# Patient Record
Sex: FEMALE | Race: WHITE | NOT HISPANIC OR LATINO | URBAN - METROPOLITAN AREA
[De-identification: names, ages, dates, MRNs, and addresses within clinical notes are randomized per-mention and may not be internally consistent; named-entity substitution may affect disease eponyms.]

---

## 2017-10-02 ENCOUNTER — HOME CARE/HOSPICE - HEALTHEAST (OUTPATIENT)
Dept: HOME HEALTH SERVICES | Facility: HOME HEALTH | Age: 82
End: 2017-10-02

## 2017-10-04 ENCOUNTER — HOME CARE/HOSPICE - HEALTHEAST (OUTPATIENT)
Dept: HOME HEALTH SERVICES | Facility: HOME HEALTH | Age: 82
End: 2017-10-04

## 2017-10-09 ENCOUNTER — HOME CARE/HOSPICE - HEALTHEAST (OUTPATIENT)
Dept: HOME HEALTH SERVICES | Facility: HOME HEALTH | Age: 82
End: 2017-10-09

## 2017-10-11 ENCOUNTER — HOME CARE/HOSPICE - HEALTHEAST (OUTPATIENT)
Dept: HOME HEALTH SERVICES | Facility: HOME HEALTH | Age: 82
End: 2017-10-11

## 2017-10-12 ENCOUNTER — HOME CARE/HOSPICE - HEALTHEAST (OUTPATIENT)
Dept: HOME HEALTH SERVICES | Facility: HOME HEALTH | Age: 82
End: 2017-10-12

## 2017-10-13 ASSESSMENT — MIFFLIN-ST. JEOR: SCORE: 916.18

## 2017-10-14 ENCOUNTER — HOME CARE/HOSPICE - HEALTHEAST (OUTPATIENT)
Dept: HOME HEALTH SERVICES | Facility: HOME HEALTH | Age: 82
End: 2017-10-14

## 2017-10-15 ENCOUNTER — HOME CARE/HOSPICE - HEALTHEAST (OUTPATIENT)
Dept: HOME HEALTH SERVICES | Facility: HOME HEALTH | Age: 82
End: 2017-10-15

## 2017-10-15 ASSESSMENT — MIFFLIN-ST. JEOR
SCORE: 841.79
SCORE: 850.86

## 2017-10-16 ENCOUNTER — SURGERY - HEALTHEAST (OUTPATIENT)
Dept: GASTROENTEROLOGY | Facility: CLINIC | Age: 82
End: 2017-10-16

## 2017-10-20 ENCOUNTER — AMBULATORY - HEALTHEAST (OUTPATIENT)
Dept: GERIATRICS | Facility: CLINIC | Age: 82
End: 2017-10-20

## 2017-10-20 ENCOUNTER — OFFICE VISIT - HEALTHEAST (OUTPATIENT)
Dept: GERIATRICS | Facility: CLINIC | Age: 82
End: 2017-10-20

## 2017-10-20 DIAGNOSIS — F32.A DEPRESSION: ICD-10-CM

## 2017-10-20 DIAGNOSIS — E78.5 DYSLIPIDEMIA: ICD-10-CM

## 2017-10-20 DIAGNOSIS — R29.6 FALLS FREQUENTLY: ICD-10-CM

## 2017-10-20 DIAGNOSIS — K51.311 ULCERATIVE RECTOSIGMOIDITIS WITH RECTAL BLEEDING (H): ICD-10-CM

## 2017-10-20 DIAGNOSIS — K62.5 RECTAL BLEED: ICD-10-CM

## 2017-10-20 RX ORDER — SERTRALINE HYDROCHLORIDE 25 MG/1
25 TABLET, FILM COATED ORAL DAILY
Status: SHIPPED | COMMUNITY
Start: 2017-10-20

## 2017-10-24 ENCOUNTER — AMBULATORY - HEALTHEAST (OUTPATIENT)
Dept: GERIATRICS | Facility: CLINIC | Age: 82
End: 2017-10-24

## 2017-10-24 ENCOUNTER — OFFICE VISIT - HEALTHEAST (OUTPATIENT)
Dept: GERIATRICS | Facility: CLINIC | Age: 82
End: 2017-10-24

## 2017-10-24 DIAGNOSIS — R29.6 FALLS FREQUENTLY: ICD-10-CM

## 2017-10-24 DIAGNOSIS — E78.5 DYSLIPIDEMIA: ICD-10-CM

## 2017-10-24 DIAGNOSIS — K51.311 ULCERATIVE RECTOSIGMOIDITIS WITH RECTAL BLEEDING (H): ICD-10-CM

## 2017-10-24 DIAGNOSIS — I10 ESSENTIAL HYPERTENSION: ICD-10-CM

## 2017-10-27 ENCOUNTER — OFFICE VISIT - HEALTHEAST (OUTPATIENT)
Dept: GERIATRICS | Facility: CLINIC | Age: 82
End: 2017-10-27

## 2017-10-27 DIAGNOSIS — K51.311 ULCERATIVE RECTOSIGMOIDITIS WITH RECTAL BLEEDING (H): ICD-10-CM

## 2017-10-27 DIAGNOSIS — I10 ESSENTIAL HYPERTENSION: ICD-10-CM

## 2017-10-27 DIAGNOSIS — F32.A DEPRESSION: ICD-10-CM

## 2017-10-27 DIAGNOSIS — E78.5 DYSLIPIDEMIA: ICD-10-CM

## 2017-10-31 ENCOUNTER — OFFICE VISIT - HEALTHEAST (OUTPATIENT)
Dept: GERIATRICS | Facility: CLINIC | Age: 82
End: 2017-10-31

## 2017-10-31 DIAGNOSIS — K55.9 ISCHEMIC COLITIS (H): ICD-10-CM

## 2017-10-31 DIAGNOSIS — K51.311 ULCERATIVE RECTOSIGMOIDITIS WITH RECTAL BLEEDING (H): ICD-10-CM

## 2017-10-31 DIAGNOSIS — R29.6 FALLS FREQUENTLY: ICD-10-CM

## 2017-11-03 ENCOUNTER — OFFICE VISIT - HEALTHEAST (OUTPATIENT)
Dept: GERIATRICS | Facility: CLINIC | Age: 82
End: 2017-11-03

## 2017-11-03 DIAGNOSIS — F32.A DEPRESSION: ICD-10-CM

## 2017-11-03 DIAGNOSIS — K51.311 ULCERATIVE RECTOSIGMOIDITIS WITH RECTAL BLEEDING (H): ICD-10-CM

## 2017-11-03 DIAGNOSIS — E78.5 DYSLIPIDEMIA: ICD-10-CM

## 2017-11-03 DIAGNOSIS — R29.6 FALLS FREQUENTLY: ICD-10-CM

## 2017-11-03 DIAGNOSIS — I10 ESSENTIAL HYPERTENSION: ICD-10-CM

## 2017-11-07 ENCOUNTER — OFFICE VISIT - HEALTHEAST (OUTPATIENT)
Dept: GERIATRICS | Facility: CLINIC | Age: 82
End: 2017-11-07

## 2017-11-07 DIAGNOSIS — F41.9 ANXIETY: ICD-10-CM

## 2017-11-07 DIAGNOSIS — F32.A DEPRESSION: ICD-10-CM

## 2017-11-07 DIAGNOSIS — E78.5 DYSLIPIDEMIA: ICD-10-CM

## 2017-11-07 DIAGNOSIS — K51.311 ULCERATIVE RECTOSIGMOIDITIS WITH RECTAL BLEEDING (H): ICD-10-CM

## 2017-11-07 DIAGNOSIS — I10 ESSENTIAL HYPERTENSION: ICD-10-CM

## 2017-11-10 ENCOUNTER — OFFICE VISIT - HEALTHEAST (OUTPATIENT)
Dept: GERIATRICS | Facility: CLINIC | Age: 82
End: 2017-11-10

## 2017-11-10 DIAGNOSIS — K55.9 ISCHEMIC COLITIS (H): ICD-10-CM

## 2017-11-10 DIAGNOSIS — R29.6 FALLS FREQUENTLY: ICD-10-CM

## 2017-11-14 ENCOUNTER — OFFICE VISIT - HEALTHEAST (OUTPATIENT)
Dept: GERIATRICS | Facility: CLINIC | Age: 82
End: 2017-11-14

## 2017-11-14 DIAGNOSIS — I10 ESSENTIAL HYPERTENSION: ICD-10-CM

## 2017-11-14 DIAGNOSIS — E78.5 HYPERLIPIDEMIA: ICD-10-CM

## 2017-11-14 DIAGNOSIS — R51.9 HEADACHE: ICD-10-CM

## 2017-11-14 DIAGNOSIS — R11.0 NAUSEA: ICD-10-CM

## 2017-11-14 DIAGNOSIS — E78.5 DYSLIPIDEMIA: ICD-10-CM

## 2017-11-14 DIAGNOSIS — F32.A DEPRESSION: ICD-10-CM

## 2017-11-14 DIAGNOSIS — K51.311 ULCERATIVE RECTOSIGMOIDITIS WITH RECTAL BLEEDING (H): ICD-10-CM

## 2017-11-16 RX ORDER — SULFASALAZINE 500 MG/1
1000 TABLET ORAL 2 TIMES DAILY
Status: SHIPPED | COMMUNITY
Start: 2017-11-16

## 2017-11-22 ENCOUNTER — AMBULATORY - HEALTHEAST (OUTPATIENT)
Dept: GERIATRICS | Facility: CLINIC | Age: 82
End: 2017-11-22

## 2017-11-24 ENCOUNTER — OFFICE VISIT - HEALTHEAST (OUTPATIENT)
Dept: GERIATRICS | Facility: CLINIC | Age: 82
End: 2017-11-24

## 2017-11-24 DIAGNOSIS — R29.6 FALLS FREQUENTLY: ICD-10-CM

## 2017-11-24 DIAGNOSIS — K51.311 ULCERATIVE RECTOSIGMOIDITIS WITH RECTAL BLEEDING (H): ICD-10-CM

## 2017-11-24 DIAGNOSIS — I26.99 PE (PULMONARY THROMBOEMBOLISM) (H): ICD-10-CM

## 2017-11-27 ENCOUNTER — AMBULATORY - HEALTHEAST (OUTPATIENT)
Dept: GERIATRICS | Facility: CLINIC | Age: 82
End: 2017-11-27

## 2017-11-27 ENCOUNTER — AMBULATORY - HEALTHEAST (OUTPATIENT)
Dept: ADMINISTRATIVE | Facility: CLINIC | Age: 82
End: 2017-11-27

## 2017-11-28 ENCOUNTER — OFFICE VISIT - HEALTHEAST (OUTPATIENT)
Dept: GERIATRICS | Facility: CLINIC | Age: 82
End: 2017-11-28

## 2017-11-28 DIAGNOSIS — K55.9 ISCHEMIC COLITIS (H): ICD-10-CM

## 2017-11-28 DIAGNOSIS — R29.6 FALLS FREQUENTLY: ICD-10-CM

## 2017-11-30 ENCOUNTER — AMBULATORY - HEALTHEAST (OUTPATIENT)
Dept: GERIATRICS | Facility: CLINIC | Age: 82
End: 2017-11-30

## 2017-12-01 ENCOUNTER — AMBULATORY - HEALTHEAST (OUTPATIENT)
Dept: GERIATRICS | Facility: CLINIC | Age: 82
End: 2017-12-01

## 2017-12-28 ENCOUNTER — OFFICE VISIT - HEALTHEAST (OUTPATIENT)
Dept: GERIATRICS | Facility: CLINIC | Age: 82
End: 2017-12-28

## 2017-12-28 DIAGNOSIS — R29.6 FALLS FREQUENTLY: ICD-10-CM

## 2017-12-28 DIAGNOSIS — I10 ESSENTIAL HYPERTENSION: ICD-10-CM

## 2017-12-28 DIAGNOSIS — K55.9 ISCHEMIC COLITIS (H): ICD-10-CM

## 2017-12-29 ENCOUNTER — AMBULATORY - HEALTHEAST (OUTPATIENT)
Dept: ADMINISTRATIVE | Facility: CLINIC | Age: 82
End: 2017-12-29

## 2017-12-29 ENCOUNTER — OFFICE VISIT - HEALTHEAST (OUTPATIENT)
Dept: GERIATRICS | Facility: CLINIC | Age: 82
End: 2017-12-29

## 2017-12-29 DIAGNOSIS — E87.1 HYPONATREMIA: ICD-10-CM

## 2017-12-29 DIAGNOSIS — D64.9 ANEMIA: ICD-10-CM

## 2017-12-29 DIAGNOSIS — I10 ESSENTIAL HYPERTENSION: ICD-10-CM
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
## 2017-12-29 DIAGNOSIS — I26.99 PE (PULMONARY THROMBOEMBOLISM) (H): ICD-10-CM

## 2017-12-29 DIAGNOSIS — K51.311 ULCERATIVE RECTOSIGMOIDITIS WITH RECTAL BLEEDING (H): ICD-10-CM

## 2017-12-29 RX ORDER — PREDNISONE 10 MG/1
5 TABLET ORAL DAILY
Status: SHIPPED | COMMUNITY
Start: 2017-12-29

## 2017-12-29 RX ORDER — LANOLIN ALCOHOL/MO/W.PET/CERES
3 CREAM (GRAM) TOPICAL
Status: SHIPPED | COMMUNITY
Start: 2017-12-29

## 2018-01-02 ENCOUNTER — RECORDS - HEALTHEAST (OUTPATIENT)
Dept: LAB | Facility: CLINIC | Age: 83
End: 2018-01-02

## 2018-01-02 ENCOUNTER — OFFICE VISIT - HEALTHEAST (OUTPATIENT)
Dept: GERIATRICS | Facility: CLINIC | Age: 83
End: 2018-01-02

## 2018-01-02 DIAGNOSIS — I10 ESSENTIAL HYPERTENSION: ICD-10-CM

## 2018-01-02 DIAGNOSIS — R29.6 FALLS FREQUENTLY: ICD-10-CM

## 2018-01-02 DIAGNOSIS — K55.9 ISCHEMIC COLITIS (H): ICD-10-CM

## 2018-01-02 LAB
ANION GAP SERPL CALCULATED.3IONS-SCNC: 6 MMOL/L (ref 5–18)
BUN SERPL-MCNC: 9 MG/DL (ref 8–28)
CALCIUM SERPL-MCNC: 8.1 MG/DL (ref 8.5–10.5)
CHLORIDE BLD-SCNC: 96 MMOL/L (ref 98–107)
CO2 SERPL-SCNC: 25 MMOL/L (ref 22–31)
CREAT SERPL-MCNC: 0.53 MG/DL (ref 0.6–1.1)
ERYTHROCYTE [DISTWIDTH] IN BLOOD BY AUTOMATED COUNT: 18.6 % (ref 11–14.5)
GFR SERPL CREATININE-BSD FRML MDRD: >60 ML/MIN/1.73M2
GLUCOSE BLD-MCNC: 97 MG/DL (ref 70–125)
HCT VFR BLD AUTO: 32.4 % (ref 35–47)
HGB BLD-MCNC: 10.3 G/DL (ref 12–16)
MCH RBC QN AUTO: 26.6 PG (ref 27–34)
MCHC RBC AUTO-ENTMCNC: 31.8 G/DL (ref 32–36)
MCV RBC AUTO: 84 FL (ref 80–100)
PLATELET # BLD AUTO: 364 THOU/UL (ref 140–440)
PMV BLD AUTO: 9.8 FL (ref 8.5–12.5)
POTASSIUM BLD-SCNC: 3.8 MMOL/L (ref 3.5–5)
RBC # BLD AUTO: 3.87 MILL/UL (ref 3.8–5.4)
SODIUM SERPL-SCNC: 127 MMOL/L (ref 136–145)
WBC: 14.9 THOU/UL (ref 4–11)

## 2018-01-03 ENCOUNTER — OFFICE VISIT - HEALTHEAST (OUTPATIENT)
Dept: GERIATRICS | Facility: CLINIC | Age: 83
End: 2018-01-03

## 2018-01-03 DIAGNOSIS — R53.1 WEAKNESS: ICD-10-CM

## 2018-01-03 DIAGNOSIS — R19.7 DIARRHEA: ICD-10-CM

## 2018-01-05 ENCOUNTER — OFFICE VISIT - HEALTHEAST (OUTPATIENT)
Dept: GERIATRICS | Facility: CLINIC | Age: 83
End: 2018-01-05

## 2018-01-05 DIAGNOSIS — K51.311 ULCERATIVE RECTOSIGMOIDITIS WITH RECTAL BLEEDING (H): ICD-10-CM

## 2018-01-05 DIAGNOSIS — R53.1 WEAKNESS: ICD-10-CM

## 2018-01-05 DIAGNOSIS — I10 ESSENTIAL HYPERTENSION: ICD-10-CM

## 2018-01-05 DIAGNOSIS — R29.6 FALLS FREQUENTLY: ICD-10-CM

## 2018-01-06 ENCOUNTER — RECORDS - HEALTHEAST (OUTPATIENT)
Dept: LAB | Facility: CLINIC | Age: 83
End: 2018-01-06

## 2018-01-08 LAB — O+P STL MICRO: NORMAL

## 2018-01-09 ENCOUNTER — OFFICE VISIT - HEALTHEAST (OUTPATIENT)
Dept: GERIATRICS | Facility: CLINIC | Age: 83
End: 2018-01-09

## 2018-01-09 DIAGNOSIS — K55.9 ISCHEMIC COLITIS (H): ICD-10-CM

## 2018-01-09 DIAGNOSIS — R29.6 FALLS FREQUENTLY: ICD-10-CM

## 2018-01-09 DIAGNOSIS — R53.1 WEAKNESS: ICD-10-CM

## 2018-01-09 LAB — BACTERIA SPEC CULT: NORMAL

## 2018-01-12 ENCOUNTER — OFFICE VISIT - HEALTHEAST (OUTPATIENT)
Dept: GERIATRICS | Facility: CLINIC | Age: 83
End: 2018-01-12

## 2018-01-12 DIAGNOSIS — R53.1 WEAKNESS: ICD-10-CM

## 2018-01-12 DIAGNOSIS — I26.99 PE (PULMONARY THROMBOEMBOLISM) (H): ICD-10-CM

## 2018-01-12 DIAGNOSIS — K51.311 ULCERATIVE RECTOSIGMOIDITIS WITH RECTAL BLEEDING (H): ICD-10-CM

## 2018-01-12 DIAGNOSIS — I10 ESSENTIAL HYPERTENSION: ICD-10-CM

## 2018-01-12 DIAGNOSIS — R29.6 FALLS FREQUENTLY: ICD-10-CM

## 2018-01-15 ENCOUNTER — AMBULATORY - HEALTHEAST (OUTPATIENT)
Dept: GERIATRICS | Facility: CLINIC | Age: 83
End: 2018-01-15

## 2020-10-06 ENCOUNTER — AMBULATORY - HEALTHEAST (OUTPATIENT)
Dept: OTHER | Facility: CLINIC | Age: 85
End: 2020-10-06

## 2021-05-31 VITALS — WEIGHT: 114 LBS | BODY MASS INDEX: 22.26 KG/M2

## 2021-05-31 VITALS — WEIGHT: 119.2 LBS | BODY MASS INDEX: 23.28 KG/M2

## 2021-05-31 VITALS — BODY MASS INDEX: 22.3 KG/M2 | HEIGHT: 60 IN | WEIGHT: 113.6 LBS

## 2021-05-31 VITALS — WEIGHT: 117.4 LBS | BODY MASS INDEX: 22.93 KG/M2

## 2021-05-31 VITALS — BODY MASS INDEX: 23.36 KG/M2 | WEIGHT: 119.6 LBS

## 2021-05-31 VITALS — BODY MASS INDEX: 22.89 KG/M2 | WEIGHT: 117.2 LBS

## 2021-05-31 VITALS — WEIGHT: 118 LBS | BODY MASS INDEX: 23.05 KG/M2

## 2021-05-31 VITALS — BODY MASS INDEX: 22.46 KG/M2 | WEIGHT: 115 LBS

## 2021-05-31 VITALS — WEIGHT: 112 LBS | BODY MASS INDEX: 21.87 KG/M2

## 2021-05-31 VITALS — WEIGHT: 112.4 LBS | BODY MASS INDEX: 21.95 KG/M2

## 2021-05-31 VITALS — BODY MASS INDEX: 22.26 KG/M2 | WEIGHT: 114 LBS

## 2021-05-31 VITALS — WEIGHT: 116.6 LBS | BODY MASS INDEX: 22.77 KG/M2

## 2021-06-13 NOTE — PROGRESS NOTES
"     [facility]    DATE: 10/20/2017    NAME:  Jessica Castellon             :  1928  MRN: [id number]  CODE STATUS:  DNR    FACILITY:  [facility] [[id number]]       ROOM:   M214    CHIEF COMPLAINT/REASON FOR VISIT:  Chief Complaint   Patient presents with     Problem Visit     Ulcerative Colitis     HISTORY OF PRESENT ILLNESS: Jessica Castellon is a 89 y.o. female with Hypertension, Dyslipidemia, Recurrent falls and Syncope who originally presented for evaluation of \"feeling off\".  She reported having intermittent rectal bleeding for months which has worsened recently.  She lost 20-30 pounds in the last several months.  She has been feeling lightheaded and falling in the last several months.  On initial evaluation in the ER patient was found afebrile, normotensive, mildly tachycardic.  Her labs were significant for microcytic anemia with hemoglobin 9.9.  CT of the abdomen and pelvis demonstrated moderate to severe acute colitis from distal transverse portion to the rectum.  Initially, ischemic versus infectious colitis was suspected, however subsequently patient underwent flexible sigmoidoscopy and pathology was mostly consistent with ulcerative colitis.  Mrs. Castellon was started on IV steroids and was discharged on steroid taper.  She will follow-up with GI as an outpatient to establish care and to determine long-term treatment.  She was stabilized and discharged to TCU for rehabilitation.    Past Medical History:   Diagnosis Date     Diabetes mellitus      Hyperlipidemia      Past Surgical History:   Procedure Laterality Date     CYST REMOVAL       SIGMOIDOSCOPY N/A 10/16/2017    Procedure: SIGMOIDOSCOPY with biopsy;  Surgeon: Trey Hercules MD;  Location: [facility];  Service:      No family history on file.  Social History     Social History     Marital status:      Spouse name: N/A     Number of children: N/A     Years of education: N/A     Occupational History "     Not on file.     Social History Main Topics     Smoking status: Never Smoker     Smokeless tobacco: Never Used     Alcohol use No     Drug use: No     Sexual activity: Not on file     Other Topics Concern     Not on file     Social History Narrative     Allergies   Allergen Reactions     Pneumococcal Vaccine Rash     Current Outpatient Prescriptions   Medication Sig Dispense Refill     acetaminophen (TYLENOL) 325 MG tablet Take 650 mg by mouth daily as needed for pain (back pain).       lovastatin (ALTOPREV) 20 MG 24 hr tablet Take 20 mg by mouth every other day.       predniSONE (DELTASONE) 10 mg tablet Take 40 mg daily for 1 week, then 30 mg daily for 1 week, then 20 mg daily for 1 week, then 10 mg daily 70 tablet 0     sertraline (ZOLOFT) 25 MG tablet Take 25 mg by mouth daily.       vitamin A-vitamin C-vit E-min (OCUVITE) Tab tablet Take 1 tablet by mouth 2 (two) times a day.       No current facility-administered medications for this visit.      REVIEW OF SYSTEMS:    Currently, no fever, chills, or rigors. Does not have any visual or hearing problems. Denies any chest pain, headaches, palpitations, lightheadedness, dizziness, shortness of breath, or cough. Appetite is good. Denies any GERD symptoms. Denies any difficulty with swallowing, nausea, or vomiting.  Denies any abdominal pain, diarrhea or constipation. Denies any urinary symptoms. No insomnia. No active bleeding. No rash.     PHYSICAL EXAMINATION:  Vitals:    10/29/17 2120   BP: 142/68   Pulse: 68   Resp: 18   Temp: 97.2  F (36.2  C)   SpO2: 98%   Weight: 119 lb 3.2 oz (54.1 kg)       GENERAL: Awake, Alert, oriented x3, not in any form of acute distress, answers questions appropriately, follows simple commands, conversant  HEENT: Head is normocephalic with normal hair distribution. No evidence of trauma. Ears: No acute purulent discharge. Eyes: Conjunctivae pink with no scleral jaundice. Nose: Normal mucosa and septum. NECK: Supple with no cervical  or supraclavicular lymphadenopathy. Trachea is midline.   CHEST: No tenderness or deformity, no crepitus  LUNG: Clear to auscultation with good chest expansion. There are no crackles or wheezes, normal AP diameter.  BACK: No kyphosis of the thoracic spine. Symmetric, no curvature, ROM normal, no CVA tenderness, no spinal tenderness   CVS: There is good S1  S2, there are no murmurs, rubs, gallops, or heaves, rhythm is regular,  2+ pulses symmetric in all extremities.  ABDOMEN: Globular and soft, nontender to palpation, non distended, no masses, no organomegaly, good bowel sounds, no rebound or guarding, no peritoneal signs.   EXTREMITIES: Atraumatic. Full range of motion on both upper and lower extremities, there is no tenderness to palpation, no pedal edema, no cyanosis or clubbing, no calf tenderness.  Pulses equal in all extremities, normal cap refill, no joint swelling.  SKIN: Warm and dry, no erythema noted.  Skin color, texture, no rashes or lesions.  NEUROLOGICAL: The patient is oriented to person, place and time. Strength and sensation are grossly intact. Face is symmetric.    LABS:    Lab Results   Component Value Date    WBC 7.2 10/15/2017    HGB 10.1 (L) 10/19/2017    HCT 25.6 (L) 10/15/2017    MCV 78 (L) 10/15/2017     10/15/2017     Results for orders placed or performed during the hospital encounter of 10/13/17   Basic Metabolic Panel   Result Value Ref Range    Sodium 131 (L) 136 - 145 mmol/L    Potassium 3.7 3.5 - 5.0 mmol/L    Chloride 102 98 - 107 mmol/L    CO2 23 22 - 31 mmol/L    Anion Gap, Calculation 6 5 - 18 mmol/L    Glucose 138 (H) 70 - 125 mg/dL    Calcium 7.7 (L) 8.5 - 10.5 mg/dL    BUN 3 (L) 8 - 28 mg/dL    Creatinine 0.62 0.60 - 1.10 mg/dL    GFR MDRD Af Amer >60 >60 mL/min/1.73m2    GFR MDRD Non Af Amer >60 >60 mL/min/1.73m2       ASSESSMENT/PLAN:      1. Ulcerative rectosigmoiditis with rectal bleeding - Stable on Prednisone taper. She will follow-up with GI as an outpatient to  establish care and to determine long-term treatment.    2. Falls frequently - Outpatient Echo was scheduled today with possible Holter monitor but this was cancelled due to hospital stay   3. Dyslipidemia - Stable on Lovastatin   4. Rectal bleed - Resolved    5. Depression - Mood stable on Zoloft         Electronically signed by:  Cristin Alvarez CNP    35 minutes TT of which 50% was spent in counseling and coordination of care of the above plan.    Time spent in interview and examination of patient, review of available records, and discussion with nursing staff. Continue care plan, efforts at therapy, and monitor nutritional status.

## 2021-06-13 NOTE — PROGRESS NOTES
"     [facility]    DATE: 10/27/2017    NAME:  Jessica Castellon             :  1928  MRN: [id number]  CODE STATUS:  DNR    FACILITY:  [facility] [[id number]]       ROOM:   M214    CHIEF COMPLAINT/REASON FOR VISIT:  Chief Complaint   Patient presents with     Problem Visit     Ulcerative colitis     HISTORY OF PRESENT ILLNESS: Jessica Castellon is a 89 y.o. female with Hypertension, Dyslipidemia, Recurrent falls and Syncope who originally presented for evaluation of \"feeling off\".  She reported having intermittent rectal bleeding for months which has worsened recently.  She lost 20-30 pounds in the last several months.  She has been feeling lightheaded and falling in the last several months.  On initial evaluation in the ER patient was found afebrile, normotensive, mildly tachycardic.  Her labs were significant for microcytic anemia with hemoglobin 9.9.  CT of the abdomen and pelvis demonstrated moderate to severe acute colitis from distal transverse portion to the rectum.  Initially, ischemic versus infectious colitis was suspected, however subsequently patient underwent flexible sigmoidoscopy and pathology was mostly consistent with ulcerative colitis.  Mrs. Castellon was started on IV steroids and was discharged on steroid taper.  She will follow-up with GI as an outpatient to establish care and to determine long-term treatment.  She was stabilized and discharged to TCU for rehabilitation.    Today, patient reports an overall improvement in her condition.  Her blood pressures are within target range and she isn't on any medications at this time.  She is on Zoloft with a stabe mood.  She continues on a steroid taper for her Ulcerative colitis.  She denies any abdominal pain/discomfort, blood or pus in her stool, reduced appetite, or fatigue.  Weights and appetite are stable.    Past Medical History:   Diagnosis Date     Diabetes mellitus      Hyperlipidemia      Past Surgical " History:   Procedure Laterality Date     CYST REMOVAL       SIGMOIDOSCOPY N/A 10/16/2017    Procedure: SIGMOIDOSCOPY with biopsy;  Surgeon: Trey Hercules MD;  Location: [facility];  Service:      No family history on file.     Social History     Social History     Marital status:      Spouse name: N/A     Number of children: N/A     Years of education: N/A     Occupational History     Not on file.     Social History Main Topics     Smoking status: Never Smoker     Smokeless tobacco: Never Used     Alcohol use No     Drug use: No     Sexual activity: Not on file     Other Topics Concern     Not on file     Social History Narrative     Allergies   Allergen Reactions     Pneumococcal Vaccine Rash     Current Outpatient Prescriptions   Medication Sig Dispense Refill     acetaminophen (TYLENOL) 325 MG tablet Take 650 mg by mouth daily as needed for pain (back pain).       lovastatin (ALTOPREV) 20 MG 24 hr tablet Take 20 mg by mouth every other day.       predniSONE (DELTASONE) 10 mg tablet Take 40 mg daily for 1 week, then 30 mg daily for 1 week, then 20 mg daily for 1 week, then 10 mg daily 70 tablet 0     sertraline (ZOLOFT) 25 MG tablet Take 25 mg by mouth daily.       vitamin A-vitamin C-vit E-min (OCUVITE) Tab tablet Take 1 tablet by mouth 2 (two) times a day.       No current facility-administered medications for this visit.      REVIEW OF SYSTEMS:    Currently, no fever, chills, or rigors. Does not have any visual or hearing problems. Denies any chest pain, headaches, palpitations, lightheadedness, dizziness, shortness of breath, or cough. Appetite is good. Denies any GERD symptoms. Denies any difficulty with swallowing, nausea, or vomiting.  Denies any abdominal pain, diarrhea or constipation. Denies any urinary symptoms. No insomnia. No active bleeding. No rash.     PHYSICAL EXAMINATION:  Vitals:    11/01/17 0655   BP: 141/78   Pulse: 85   Resp: 18   Temp: 97.9  F (36.6  C)   SpO2: 97%   Weight:  117 lb 3.2 oz (53.2 kg)       GENERAL: Awake, Alert, oriented x3, not in any form of acute distress, answers questions appropriately, follows simple commands, conversant  HEENT: Head is normocephalic with normal hair distribution. No evidence of trauma. Ears: No acute purulent discharge. Eyes: Conjunctivae pink with no scleral jaundice. Nose: Normal mucosa and septum. NECK: Supple with no cervical or supraclavicular lymphadenopathy. Trachea is midline.   CHEST: No tenderness or deformity, no crepitus  LUNG: Clear to auscultation with good chest expansion. There are no crackles or wheezes, normal AP diameter.  BACK: No kyphosis of the thoracic spine. Symmetric, no curvature, ROM normal, no CVA tenderness, no spinal tenderness   CVS: There is good S1  S2, there are no murmurs, rubs, gallops, or heaves, rhythm is regular,  2+ pulses symmetric in all extremities.  ABDOMEN: Globular and soft, nontender to palpation, non distended, no masses, no organomegaly, good bowel sounds, no rebound or guarding, no peritoneal signs.   EXTREMITIES: Atraumatic. Full range of motion on both upper and lower extremities, there is no tenderness to palpation, no pedal edema, no cyanosis or clubbing, no calf tenderness.  Pulses equal in all extremities, normal cap refill, no joint swelling.  SKIN: Warm and dry, no erythema noted.  Skin color, texture, no rashes or lesions.  NEUROLOGICAL: The patient is oriented to person, place and time. Strength and sensation are grossly intact. Face is symmetric.    LABS:    Lab Results   Component Value Date    WBC 7.2 10/15/2017    HGB 10.1 (L) 10/19/2017    HCT 25.6 (L) 10/15/2017    MCV 78 (L) 10/15/2017     10/15/2017     Results for orders placed or performed during the hospital encounter of 10/13/17   Basic Metabolic Panel   Result Value Ref Range    Sodium 131 (L) 136 - 145 mmol/L    Potassium 3.7 3.5 - 5.0 mmol/L    Chloride 102 98 - 107 mmol/L    CO2 23 22 - 31 mmol/L    Anion Gap,  Calculation 6 5 - 18 mmol/L    Glucose 138 (H) 70 - 125 mg/dL    Calcium 7.7 (L) 8.5 - 10.5 mg/dL    BUN 3 (L) 8 - 28 mg/dL    Creatinine 0.62 0.60 - 1.10 mg/dL    GFR MDRD Af Amer >60 >60 mL/min/1.73m2    GFR MDRD Non Af Amer >60 >60 mL/min/1.73m2       ASSESSMENT/PLAN:    1. Ulcerative rectosigmoiditis with rectal bleeding - Stable on Prednisone taper. She will follow-up with GI as an outpatient to establish care and to determine long-term treatment.     2. Essential hypertension - Blood pressures are within target   3. Depression  - Mood stable on Zoloft   4. Dyslipidemia - Stable on Lovastatin         Electronically signed by:  Cristin Alvarez CNP

## 2021-06-13 NOTE — PROGRESS NOTES
[facility]      Facility:    [facility] [[id number]]  Code Status: DNR      Chief Complaint/Reason for Visit:  Chief Complaint   Patient presents with     H & P       HPI:   Jessica is a 89 y.o. female who presented to the hospital because she just was not feeling well, and had been having rectal bleeding intermittently over the past couple of months and also had numerous falls in the last couple of months.  CT scan of the abdomen revealed colitis and flexible sigmoidoscopy showed this to be consistent with ulcerative colitis rather than some steroids R.  Her blood pressure medicines were held.  She was having low blood pressure and has lost 20-30 pounds over the last couple of months.  She does have a history of dyslipidemia and has had some elevated blood sugars in the past, but at present time her blood sugar is at the high end of normal at 120.    Past Medical History:  Past Medical History:   Diagnosis Date     Diabetes mellitus      Hyperlipidemia            Surgical History:  Past Surgical History:   Procedure Laterality Date     CYST REMOVAL       SIGMOIDOSCOPY N/A 10/16/2017    Procedure: SIGMOIDOSCOPY with biopsy;  Surgeon: Trey Hercules MD;  Location: [facility];  Service:        Family History:   No family history on file.    Social History:    Social History     Social History     Marital status:      Spouse name: N/A     Number of children: N/A     Years of education: N/A     Social History Main Topics     Smoking status: Never Smoker     Smokeless tobacco: Never Used     Alcohol use No     Drug use: No     Sexual activity: Not on file     Other Topics Concern     Not on file     Social History Narrative          Review of Systems   All other systems reviewed and are negative.      Vitals:    10/23/17 1939   BP: 125/70   Pulse: 90   Resp: 18   Temp: 97.2  F (36.2  C)   SpO2: 97%   Weight: 119 lb 3.2 oz (54.1 kg)       Physical Exam   Constitutional: She is  oriented to person, place, and time. No distress.   HENT:   Head: Atraumatic.   Mouth/Throat: Oropharynx is clear and moist.   Eyes: EOM are normal. Pupils are equal, round, and reactive to light.   Neck: No thyromegaly present.   Cardiovascular: Normal rate, regular rhythm and normal heart sounds.    Pulmonary/Chest: Effort normal and breath sounds normal.   Abdominal: Soft. Bowel sounds are normal.   Musculoskeletal: She exhibits no edema.   Lymphadenopathy:     She has no cervical adenopathy.   Neurological: She is alert and oriented to person, place, and time.   Skin: Skin is warm and dry.   Psychiatric: She has a normal mood and affect. Her behavior is normal. Thought content normal.   Vitals reviewed.      Medication List:  Current Outpatient Prescriptions   Medication Sig     acetaminophen (TYLENOL) 325 MG tablet Take 650 mg by mouth daily as needed for pain (back pain).     lovastatin (ALTOPREV) 20 MG 24 hr tablet Take 20 mg by mouth every other day.     predniSONE (DELTASONE) 10 mg tablet Take 40 mg daily for 1 week, then 30 mg daily for 1 week, then 20 mg daily for 1 week, then 10 mg daily     sertraline (ZOLOFT) 25 MG tablet Take 25 mg by mouth daily.     vitamin A-vitamin C-vit E-min (OCUVITE) Tab tablet Take 1 tablet by mouth 2 (two) times a day.       Labs:  Sodium 129, glucose 120, BUN 9, creatinine 0.68, hemoglobin 9.9, WBC 11,800.  These are tests that were done at the hospital    Assessment:    ICD-10-CM    1. Ulcerative rectosigmoiditis with rectal bleeding K51.311    2. Falls frequently R29.6    3. Essential hypertension I10    4. Dyslipidemia E78.5        Plan:  Physical therapy and occupational therapy to help out for her physical deconditioning that has occurred with the significant weight loss, frequent falls, and now colitis which also has contributed to blood loss anemia.  Blood pressure medications will be held, which had previously been lisinopril 20 mg daily and Tenormin 50 mg daily.   Blood sugars will need to monitored with the use of prednisone with her past history, and follow-up with BMP will be indicated as well as CBC.      Electronically signed by: Pete Koch MD

## 2021-06-13 NOTE — PROGRESS NOTES
"[facility]    Facility:   [facility] [[id number]]   Code Status: DNR  PCP: Shoshana Faust PA-C   Phone: [phone]   Fax: [phone]      CHIEF COMPLAINT/REASON FOR VISIT:  Chief Complaint   Patient presents with     Discharge Summary       HISTORY COURSE:  Jessica Castellon is a 89 y.o. female with Hypertension, Dyslipidemia, Recurrent falls and Syncope who originally presented for evaluation of \"feeling off\".  She reported having intermittent rectal bleeding for months which has worsened recently.  She lost 20-30 pounds in the last several months.  She has been feeling lightheaded and falling in the last several months. On initial evaluation in the ER patient was found afebrile, normotensive, mildly tachycardic. Her labs were significant for microcytic anemia with hemoglobin 9.9. CT of the abdomen and pelvis demonstrated moderate to severe acute colitis from distal transverse portion to the rectum.  Initially, ischemic versus infectious colitis was suspected, however subsequently patient underwent flexible sigmoidoscopy and pathology was mostly consistent with ulcerative colitis.  Mrs. Castellon was started on IV steroids and was discharged on steroid taper.  She will follow-up with GI as an outpatient to establish care and to determine long-term treatment. She was stabilized and discharged to TCU for rehabilitation.    Today she has presented for a discharge evaluation. She reports that she is feeling well and does not have any physical concerns. She denies pain, chest pain, abdominal pain, shortness of breath, difficulty breathing,nausea, vomiting, diarrhea, fevers/chills, headaches, or any other concerns. She is using the bathroom regularly and has not noticed any blood in her stool. She does report that she has some concerns going home, as she does have a granddaughter there but she is unable to help being that she is at work for about 14 hours per day. Mrs. Castellon reports " "that she is just nervous about getting help. She has been comforted by the fact that a consultation for home health care has been made. She has no other concerns or questions at this time.     Some time later in the day family reports to nursing staff that patient does have some \"funny feelings in her head\". This happens infrequently and without correlation to any events. It occasionally happens with position change and at times out of nowhere. She denies any difficulties with chest pain/pressure, headaches, lightheadedness, nausea, vomiting, vision changes. She denies hypoglycemia. She feels it may be related to her vision, but she is really unsure.     PHYSICAL EXAM:   /67  Pulse 62  Resp 16  Wt 119 lb 9.6 oz (54.3 kg)  SpO2 97%  BMI 23.36 kg/m2  General appearance: alert, appears stated age and cooperative  Head: Normocephalic, without obvious abnormality, atraumatic  Throat: lips, mucosa, and tongue normal; teeth and gums normal  Neck: no adenopathy, no JVD, supple, symmetrical, trachea midline and thyroid not enlarged, symmetric, no tenderness/mass/nodules  Lungs: clear to auscultation bilaterally  Heart: regular rate and rhythm, S1, S2 normal, no murmur, click, rub or gallop  Abdomen: soft, non-tender; bowel sounds normal; no masses,  no organomegaly  Pulses: 2+ and symmetric  Skin: Skin color, texture, turgor normal. No rashes or lesions  Lymph nodes: cervical, supraclavicular, and anterior nodes without lymphadenopathy.    MEDICATION LIST:  Current Outpatient Prescriptions   Medication Sig     acetaminophen (TYLENOL) 325 MG tablet Take 650 mg by mouth daily as needed for pain (back pain).     lovastatin (ALTOPREV) 20 MG 24 hr tablet Take 20 mg by mouth every other day.     predniSONE (DELTASONE) 10 mg tablet Take 40 mg daily for 1 week, then 30 mg daily for 1 week, then 20 mg daily for 1 week, then 10 mg daily     sertraline (ZOLOFT) 25 MG tablet Take 25 mg by mouth daily.     vitamin A-vitamin " C-vit E-min (OCUVITE) Tab tablet Take 1 tablet by mouth 2 (two) times a day.       DISCHARGE DIAGNOSIS:    ICD-10-CM    1. Ischemic colitis K55.9    2. Falls frequently R29.6    3. 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 Possible Presyncope         MEDICAL EQUIPMENT NEEDS:  Walker  Assist devices for bathing and grooming  Assist devices for cleaning the home  Assist devices for ADLs    DISCHARGE PLAN/FACE TO FACE:  I certify that services are/were furnished while this patient was under the care of a physician and that a physician or an allowed non-physician practitioner (NPP), had a face-to-face encounter that meets the physician face-to-face encounter requirements. The encounter was in whole, or in part, related to the primary reason for home health. The patient is confined to his/her home and needs intermittent skilled nursing, physical therapy, speech-language pathology, or the continued need for occupational therapy. A plan of care has been established by a physician and is periodically reviewed by a physician.  Date of Face-to-Face Encounter: 10/31/17    I certify that, based on my findings, the following services are medically necessary home health services:   PT/OT  RN  Home Health Aid    My clinical findings support the need for the above skilled services because:  The patient requires in home care to help with ADLs, care of the home, medication management, strength building, and adaptive skills.     This patient is homebound because:   She is an 89 year old frail, elderly woman with a history of frequent falls, essential hypertension, and rectal bleeding. She requires a walker and assistance for ADLs.     The patient is, or has been, under my care and I have initiated the establishment of the plan of care. This patient will be followed by a physician who will periodically review the plan of care.    DISCHARGE PLAN:    ICD-10-CM    1. Ischemic colitis K55.9 Follow up with GI per  recommendations.    2. Falls frequently R29.6 Orders and referral for home care, PT/OT, and RN visits made per this note. Patient encouraged to follow safety precautions, always use a walker and assist devices and to continue to work with OT/PT. Follow up with PCP routinely and as needed.    3. Ulcerative rectosigmoiditis with rectal bleeding K51.311 Follow up with GI per recommendations.    4.  Possibly Presyncope  Orders for orthostatic blood pressures x1 and when symptomatic. Blood sugars 3 times daily and when symptomatic. Follow up in 3 days.        Greater than 35 minutes spent with this patient discussing discharge planning, home care, and discharge needs with patient, greater than 55% of this time spent on counseling.    Electronically signed by:  (DR. FORD)    Scribed by CINDA Peck, CNP

## 2021-06-13 NOTE — PROGRESS NOTES
"     [facility]    DATE: 11/3/2017    NAME:  Jessica Castellon             :  1928  MRN: [id number]  CODE STATUS:  DNR    FACILITY:  [facility] [[id number]]       ROOM:   M214    CHIEF COMPLAINT/REASON FOR VISIT:  Chief Complaint   Patient presents with     Problem Visit     Ulcerative Colitis     HISTORY OF PRESENT ILLNESS: Jessica Castellon is a 89 y.o. female with Hypertension, Dyslipidemia, Recurrent falls and Syncope who originally presented for evaluation of \"feeling off\".  She reported having intermittent rectal bleeding for months which has worsened recently.  She lost 20-30 pounds in the last several months.  She has been feeling lightheaded and falling in the last several months.  On initial evaluation in the ER patient was found afebrile, normotensive, mildly tachycardic.  Her labs were significant for microcytic anemia with hemoglobin 9.9.  CT of the abdomen and pelvis demonstrated moderate to severe acute colitis from distal transverse portion to the rectum.  Initially, ischemic versus infectious colitis was suspected, however subsequently patient underwent flexible sigmoidoscopy and pathology was mostly consistent with ulcerative colitis.  Mrs. Castellon was started on IV steroids and was discharged on steroid taper.  She will follow-up with GI as an outpatient to establish care and to determine long-term treatment.  She was stabilized and discharged to TCU for rehabilitation.    Today, patient reports an overall improvement in her condition.  She denies any abdominal pain/discomfort, blood or pus in stool, or frequent recurring diarrhea. She denies any symptoms of  Tenesmus. She is afebrile and weights are stable.  Her appetite is good. No fatigue.      Past Medical History:   Diagnosis Date     Diabetes mellitus      Hyperlipidemia      Past Surgical History:   Procedure Laterality Date     CYST REMOVAL       SIGMOIDOSCOPY N/A 10/16/2017    Procedure: SIGMOIDOSCOPY " with biopsy;  Surgeon: Trey Hercules MD;  Location: [facility];  Service:      No family history on file.     Social History     Social History     Marital status:      Spouse name: N/A     Number of children: N/A     Years of education: N/A     Occupational History     Not on file.     Social History Main Topics     Smoking status: Never Smoker     Smokeless tobacco: Never Used     Alcohol use No     Drug use: No     Sexual activity: Not on file     Other Topics Concern     Not on file     Social History Narrative     Allergies   Allergen Reactions     Pneumococcal Vaccine Rash     Current Outpatient Prescriptions   Medication Sig Dispense Refill     acetaminophen (TYLENOL) 325 MG tablet Take 650 mg by mouth daily as needed for pain (back pain).       lovastatin (ALTOPREV) 20 MG 24 hr tablet Take 20 mg by mouth every other day.       predniSONE (DELTASONE) 10 mg tablet Take 40 mg daily for 1 week, then 30 mg daily for 1 week, then 20 mg daily for 1 week, then 10 mg daily 70 tablet 0     sertraline (ZOLOFT) 25 MG tablet Take 25 mg by mouth daily.       vitamin A-vitamin C-vit E-min (OCUVITE) Tab tablet Take 1 tablet by mouth 2 (two) times a day.       No current facility-administered medications for this visit.      REVIEW OF SYSTEMS:    Currently, no fever, chills, or rigors. Does not have any visual or hearing problems. Denies any chest pain, headaches, palpitations, lightheadedness, dizziness, shortness of breath, or cough. Appetite is good. Denies any GERD symptoms. Denies any difficulty with swallowing, nausea, or vomiting.  Denies any abdominal pain, diarrhea or constipation. Denies any urinary symptoms. No insomnia. No active bleeding. No rash.     PHYSICAL EXAMINATION:  Vitals:    11/06/17 1200   BP: 112/67   Pulse: 60   Resp: 18   Temp: 96.7  F (35.9  C)   SpO2: 98%   Weight: 119 lb 9.6 oz (54.3 kg)       GENERAL: Awake, Alert, oriented x3, not in any form of acute distress, answers questions  appropriately, follows simple commands, conversant  HEENT: Head is normocephalic with normal hair distribution. No evidence of trauma. Ears: No acute purulent discharge. Eyes: Conjunctivae pink with no scleral jaundice. Nose: Normal mucosa and septum. NECK: Supple with no cervical or supraclavicular lymphadenopathy. Trachea is midline.   CHEST: No tenderness or deformity, no crepitus  LUNG: Clear to auscultation with good chest expansion. There are no crackles or wheezes, normal AP diameter.  BACK: No kyphosis of the thoracic spine. Symmetric, no curvature, ROM normal, no CVA tenderness, no spinal tenderness   CVS: There is good S1  S2, there are no murmurs, rubs, gallops, or heaves, rhythm is regular,  2+ pulses symmetric in all extremities.  ABDOMEN: Globular and soft, nontender to palpation, non distended, no masses, no organomegaly, good bowel sounds, no rebound or guarding, no peritoneal signs.   EXTREMITIES: Atraumatic. Full range of motion on both upper and lower extremities, there is no tenderness to palpation, no pedal edema, no cyanosis or clubbing, no calf tenderness.  Pulses equal in all extremities, normal cap refill, no joint swelling.  SKIN: Warm and dry, no erythema noted.  Skin color, texture, no rashes or lesions.  NEUROLOGICAL: The patient is oriented to person, place and time. Strength and sensation are grossly intact. Face is symmetric.    LABS:    Lab Results   Component Value Date    WBC 7.2 10/15/2017    HGB 10.1 (L) 10/19/2017    HCT 25.6 (L) 10/15/2017    MCV 78 (L) 10/15/2017     10/15/2017     Results for orders placed or performed during the hospital encounter of 10/13/17   Basic Metabolic Panel   Result Value Ref Range    Sodium 131 (L) 136 - 145 mmol/L    Potassium 3.7 3.5 - 5.0 mmol/L    Chloride 102 98 - 107 mmol/L    CO2 23 22 - 31 mmol/L    Anion Gap, Calculation 6 5 - 18 mmol/L    Glucose 138 (H) 70 - 125 mg/dL    Calcium 7.7 (L) 8.5 - 10.5 mg/dL    BUN 3 (L) 8 - 28 mg/dL     Creatinine 0.62 0.60 - 1.10 mg/dL    GFR MDRD Af Amer >60 >60 mL/min/1.73m2    GFR MDRD Non Af Amer >60 >60 mL/min/1.73m2       ASSESSMENT/PLAN:    1. Ulcerative rectosigmoiditis with rectal bleeding - Stable. Prednisone taper almost complete. She will follow-up with GI as an outpatient to establish care and to determine long-term treatment.     2. Falls frequently - No recent falls   3. Dyslipidemia - Stable on Lovastatin   4. Essential hypertension  - Blood pressures are within target range, not on any medications at this time   5. Depression  - Mood stable on Zoloft             Electronically signed by:  Cristin Alvarez CNP

## 2021-06-14 NOTE — PROGRESS NOTES
[facility]      Facility:    [facility] [[id number]]  Code Status: DNR      Chief Complaint/Reason for Visit:  Chief Complaint   Patient presents with     Review Of Multiple Medical Conditions     Ischemic Bowel Disease, Frequent Falls       HPI:   Jessica is a 89 y.o. female who was recently hospitalized for what appeared to be diverticulitis but may have been a flareup of underlying colitis, and incidentally was discovered to have pulmonary embolus.  The workup did not show any underlying malignancy but that question had been raised during the hospitalization considering this asymptomatic incidental finding of pulmonary embolus.  She does have a history of diabetes mellitus but blood sugars have been in normal range with diet and exercise alone, but more recently blood sugars have risen with the steroid therapy for colitis.      Today she is being evaluated for a routine follow-up. She states she is feeling well and is quite happy with her current treatment plan. She does not have any medical problems that she would like to discuss today. She denies any other concerns including headaches, vision changes, chest pain/pressure, difficulty breathing, SOB, abdominal pain, nausea, vomiting, diarrhea, dysuria, increasing weakness.     Past Medical History:  Past Medical History:   Diagnosis Date     Acute diverticulitis      Colitis      Diabetes mellitus      HTN (hypertension)      Hyperlipidemia      Hyponatremia      Pulmonary thromboembolism            Surgical History:  Past Surgical History:   Procedure Laterality Date     CYST REMOVAL       SIGMOIDOSCOPY N/A 10/16/2017    Procedure: SIGMOIDOSCOPY with biopsy;  Surgeon: Trey Hercules MD;  Location: [facility];  Service:        Family History:   Family History   Problem Relation Age of Onset     No Medical Problems Mother      No Medical Problems Father        Social History:    Social History     Social History     Marital  status:      Spouse name: N/A     Number of children: N/A     Years of education: N/A     Social History Main Topics     Smoking status: Never Smoker     Smokeless tobacco: Never Used     Alcohol use No     Drug use: No     Sexual activity: Not on file     Other Topics Concern     Not on file     Social History Narrative       Review of Systems   All other systems reviewed and are negative.      Vitals:    12/01/17 1344   BP: 134/68   Pulse: 68   Resp: 18   Temp: 97.8  F (36.6  C)   SpO2: 98%   Weight: 112 lb 6.4 oz (51 kg)       Physical Exam   Constitutional: No distress.   Cardiovascular: Normal rate, regular rhythm and normal heart sounds.    Pulmonary/Chest: Effort normal and breath sounds normal.   Abdominal: Soft. Bowel sounds are normal. She exhibits no distension. There is no tenderness. There is no guarding.   Musculoskeletal: She exhibits no edema.   Neurological: She is alert.   Skin: Skin is warm and dry.   Psychiatric: She has a normal mood and affect. Her behavior is normal.   Nursing note and vitals reviewed.    Medication List:  Current Outpatient Prescriptions   Medication Sig     acetaminophen (TYLENOL) 325 MG tablet Take 650 mg by mouth every 4 (four) hours as needed for pain (back pain).      lovastatin (ALTOPREV) 20 MG 24 hr tablet Take 20 mg by mouth at bedtime.      ondansetron (ZOFRAN) 4 MG tablet Take 4 mg by mouth every 8 (eight) hours as needed for nausea.     predniSONE (DELTASONE) 10 mg tablet Take 40 mg daily for 1 week, then 30 mg daily for 1 week, then 20 mg daily for 1 week, then 10 mg daily     sertraline (ZOLOFT) 25 MG tablet Take 25 mg by mouth daily.     sulfaSALAzine (AZULFIDINE) 500 mg tablet Take 1,000 mg by mouth 2 (two) times a day.     vitamin A-vitamin C-vit E-min (OCUVITE) Tab tablet Take 1 tablet by mouth 2 (two) times a day.     WARFARIN SODIUM (WARFARIN ORAL) Take by mouth daily. 11/30/17 INR 2.3. Take 4mg daily. Next INR 12/8 11/27/17 INR 2.30. Take 4mg  daily. Next INR 11/30 11/22/17 1.3. Take 3mg daily. Next INR 11/24. Adjust dose based on INR results as directed.       Labs:  None today.    Assessment:    ICD-10-CM    1. Ischemic colitis K55.9    2. Falls frequently R29.6      Plan:  She is working with physical and Occupational Therapy, and the INR is being monitored with gradual rise of Coumadin until in the therapeutic range of 2-3 for the INR. Continue current care plan for all chronic medical conditions, as they are stable. Encouraged patient to engage in healthy lifestyle behaviors such as engaging in social activities, exercising, eating well, and following their care plan. Follow up this week for routine check-up, or sooner if needed. Will continue to monitor patient and work with nursing staff collaboratively to work toward positive patient outcomes.    Greater than 25 minutes spent with patient with at least 55% of this time spent on counseling, education, and discussion of the above care plan with nursing staff, and patient.     Electronically signed by: Felecia Bazan CNP

## 2021-06-14 NOTE — PROGRESS NOTES
[facility]      Facility:    [facility] [[id number]]  Code Status: DNR      Chief Complaint/Reason for Visit:  Chief Complaint   Patient presents with     H & P       HPI:   Jessica is a 89 y.o. female who was recently hospitalized for what appeared to be diverticulitis but may have been a flareup of underlying colitis, and incidentally was discovered to have pulmonary embolus.  The workup did not show any underlying malignancy but that question had been raised during the hospitalization considering this asymptomatic incidental finding of pulmonary embolus.  She does have a history of diabetes mellitus but blood sugars have been in normal range with diet and exercise alone, but more recently blood sugars have risen with the steroid therapy for colitis.  She is currently without chest pain or shortness of breath.  She is not experiencing abdominal pain, blood in the stool, nor diarrhea.  She does have underlying fatigue and has history of frequent falls.  She is not experiencing edema.  She is not experiencing headache.    Past Medical History:  Past Medical History:   Diagnosis Date     Diabetes mellitus      Hyperlipidemia            Surgical History:  Past Surgical History:   Procedure Laterality Date     CYST REMOVAL       SIGMOIDOSCOPY N/A 10/16/2017    Procedure: SIGMOIDOSCOPY with biopsy;  Surgeon: Trey Hercules MD;  Location: [facility];  Service:        Family History:   No family history on file.    Social History:    Social History     Social History     Marital status:      Spouse name: N/A     Number of children: N/A     Years of education: N/A     Social History Main Topics     Smoking status: Never Smoker     Smokeless tobacco: Never Used     Alcohol use No     Drug use: No     Sexual activity: Not on file     Other Topics Concern     Not on file     Social History Narrative          Review of Systems   All other systems reviewed and are  negative.      Vitals:    11/24/17 1132   BP: 135/78   Pulse: 87   Resp: 16   Temp: (!) 95.8  F (35.4  C)   SpO2: 96%       Physical Exam   Constitutional: No distress.   HENT:   Mouth/Throat: Oropharynx is clear and moist.   Eyes: EOM are normal. Pupils are equal, round, and reactive to light. Right eye exhibits no discharge. Left eye exhibits no discharge.   Neck: No thyromegaly present.   Cardiovascular: Normal rate, regular rhythm and normal heart sounds.    Pulmonary/Chest: Effort normal and breath sounds normal.   Abdominal: Soft. Bowel sounds are normal. She exhibits no distension. There is no tenderness. There is no guarding.   Musculoskeletal: She exhibits no edema.   Lymphadenopathy:     She has no cervical adenopathy.   Neurological: She is alert.   Skin: Skin is warm and dry.   Psychiatric: She has a normal mood and affect. Her behavior is normal.   Nursing note and vitals reviewed.    TM's normal and canals clear.  Medication List:  Current Outpatient Prescriptions   Medication Sig     acetaminophen (TYLENOL) 325 MG tablet Take 650 mg by mouth daily as needed for pain (back pain).     lovastatin (ALTOPREV) 20 MG 24 hr tablet Take 20 mg by mouth every other day.     ondansetron (ZOFRAN) 4 MG tablet Take 4 mg by mouth every 8 (eight) hours as needed for nausea.     predniSONE (DELTASONE) 10 mg tablet Take 40 mg daily for 1 week, then 30 mg daily for 1 week, then 20 mg daily for 1 week, then 10 mg daily     sertraline (ZOLOFT) 25 MG tablet Take 25 mg by mouth daily.     sulfaSALAzine (AZULFIDINE) 500 mg tablet Take 1,000 mg by mouth 2 (two) times a day.     vitamin A-vitamin C-vit E-min (OCUVITE) Tab tablet Take 1 tablet by mouth 2 (two) times a day.     WARFARIN SODIUM (WARFARIN ORAL) Take by mouth daily. 11/22/17 1.3. Take 3mg daily. Next INR 11/24. Adjust dose based on INR results as directed.       Labs:  Blood glucose 240 yesterday.    Assessment:    ICD-10-CM    1. Ulcerative rectosigmoiditis with  rectal bleeding K51.311    2. PE (pulmonary thromboembolism) I26.99    3. Falls frequently R29.6        Plan:  She is working with physical and Occupational Therapy, and the INR is being monitored with gradual rise of Coumadin until in the therapeutic range of 2-3 for the INR.      Electronically signed by: Pete Koch MD

## 2021-06-14 NOTE — PROGRESS NOTES
"     [facility]    DATE: 2017    NAME:  Jessica Castellon             :  1928  MRN: [id number]  CODE STATUS:  DNR    FACILITY:  [facility] [[id number]]       ROOM:   M214    CHIEF COMPLAINT/REASON FOR VISIT:  Chief Complaint   Patient presents with     Problem Visit     Depression and Anxiety     HISTORY OF PRESENT ILLNESS: Jessica Castellon is a 89 y.o. female with Hypertension, Dyslipidemia, Recurrent falls and Syncope who originally presented for evaluation of \"feeling off\".  She reported having intermittent rectal bleeding for months which has worsened recently.  She lost 20-30 pounds in the last several months.  She has been feeling lightheaded and falling in the last several months.  On initial evaluation in the ER patient was found afebrile, normotensive, mildly tachycardic.  Her labs were significant for microcytic anemia with hemoglobin 9.9.  CT of the abdomen and pelvis demonstrated moderate to severe acute colitis from distal transverse portion to the rectum.  Initially, ischemic versus infectious colitis was suspected, however subsequently patient underwent flexible sigmoidoscopy and pathology was mostly consistent with ulcerative colitis.  Mrs. Castellon was started on IV steroids and was discharged on steroid taper.  She will follow-up with GI as an outpatient to establish care and to determine long-term treatment.  She was stabilized and discharged to TCU for rehabilitation.    Today, patient reports an overall improvement in her condition.  She reports normal bowel movements.  According to the staff, she has been on 15 minutes checks due to statements of not wanting to live any more and she just wanted to die. At that time she was very anxious.  Patient and family agreed to take something for Depression.  Since the initiation of Zoloft, her mood has been stable and she hasn't exhibited any further symptoms of wanting to harm herself.     Past Medical History: "   Diagnosis Date     Diabetes mellitus      Hyperlipidemia      Past Surgical History:   Procedure Laterality Date     CYST REMOVAL       SIGMOIDOSCOPY N/A 10/16/2017    Procedure: SIGMOIDOSCOPY with biopsy;  Surgeon: Trey Hercules MD;  Location: [facility];  Service:      No family history on file.     Social History     Social History     Marital status:      Spouse name: N/A     Number of children: N/A     Years of education: N/A     Occupational History     Not on file.     Social History Main Topics     Smoking status: Never Smoker     Smokeless tobacco: Never Used     Alcohol use No     Drug use: No     Sexual activity: Not on file     Other Topics Concern     Not on file     Social History Narrative     Allergies   Allergen Reactions     Pneumococcal Vaccine Rash     Current Outpatient Prescriptions   Medication Sig Dispense Refill     acetaminophen (TYLENOL) 325 MG tablet Take 650 mg by mouth daily as needed for pain (back pain).       lovastatin (ALTOPREV) 20 MG 24 hr tablet Take 20 mg by mouth every other day.       predniSONE (DELTASONE) 10 mg tablet Take 40 mg daily for 1 week, then 30 mg daily for 1 week, then 20 mg daily for 1 week, then 10 mg daily 70 tablet 0     sertraline (ZOLOFT) 25 MG tablet Take 25 mg by mouth daily.       vitamin A-vitamin C-vit E-min (OCUVITE) Tab tablet Take 1 tablet by mouth 2 (two) times a day.       No current facility-administered medications for this visit.      REVIEW OF SYSTEMS:    Currently, no fever, chills, or rigors. Does not have any visual or hearing problems. Denies any chest pain, headaches, palpitations, lightheadedness, dizziness, shortness of breath, or cough. Appetite is good. Denies any GERD symptoms. Denies any difficulty with swallowing, nausea, or vomiting.  Denies any abdominal pain, diarrhea or constipation. Denies any urinary symptoms. No insomnia. No active bleeding. No rash.     PHYSICAL EXAMINATION:  Vitals:    11/10/17 1925   BP:  122/71   Pulse: 88   Resp: 16   Temp: (!) 96.2  F (35.7  C)   SpO2: 97%   Weight: 116 lb 9.6 oz (52.9 kg)       GENERAL: Awake, Alert, oriented x3, not in any form of acute distress, answers questions appropriately, follows simple commands, conversant  HEENT: Head is normocephalic with normal hair distribution. No evidence of trauma. Ears: No acute purulent discharge. Eyes: Conjunctivae pink with no scleral jaundice. Nose: Normal mucosa and septum. NECK: Supple with no cervical or supraclavicular lymphadenopathy. Trachea is midline.   CHEST: No tenderness or deformity, no crepitus  LUNG: Clear to auscultation with good chest expansion. There are no crackles or wheezes, normal AP diameter.  BACK: No kyphosis of the thoracic spine. Symmetric, no curvature, ROM normal, no CVA tenderness, no spinal tenderness   CVS: There is good S1  S2, there are no murmurs, rubs, gallops, or heaves, rhythm is regular,  2+ pulses symmetric in all extremities.  ABDOMEN: Globular and soft, nontender to palpation, non distended, no masses, no organomegaly, good bowel sounds, no rebound or guarding, no peritoneal signs.   EXTREMITIES: Atraumatic. Full range of motion on both upper and lower extremities, there is no tenderness to palpation, no pedal edema, no cyanosis or clubbing, no calf tenderness.  Pulses equal in all extremities, normal cap refill, no joint swelling.  SKIN: Warm and dry, no erythema noted.  Skin color, texture, no rashes or lesions.  NEUROLOGICAL: The patient is oriented to person, place and time. Strength and sensation are grossly intact. Face is symmetric.    LABS:    Lab Results   Component Value Date    WBC 7.2 10/15/2017    HGB 10.1 (L) 10/19/2017    HCT 25.6 (L) 10/15/2017    MCV 78 (L) 10/15/2017     10/15/2017     Results for orders placed or performed during the hospital encounter of 10/13/17   Basic Metabolic Panel   Result Value Ref Range    Sodium 131 (L) 136 - 145 mmol/L    Potassium 3.7 3.5 - 5.0  mmol/L    Chloride 102 98 - 107 mmol/L    CO2 23 22 - 31 mmol/L    Anion Gap, Calculation 6 5 - 18 mmol/L    Glucose 138 (H) 70 - 125 mg/dL    Calcium 7.7 (L) 8.5 - 10.5 mg/dL    BUN 3 (L) 8 - 28 mg/dL    Creatinine 0.62 0.60 - 1.10 mg/dL    GFR MDRD Af Amer >60 >60 mL/min/1.73m2    GFR MDRD Non Af Amer >60 >60 mL/min/1.73m2       ASSESSMENT/PLAN:    1. Depression - Mood is stable on Zoloft.  We will discontinue 15 minute checks   2. Anxiety - See above   3. Ulcerative rectosigmoiditis with rectal bleeding - Stable. Prednisone taper almost complete. She will follow-up with GI as an outpatient to establish care and to determine long-term treatment.     4. Essential hypertension  - Blood pressures are within target range, not on any medications at this time   5. Dyslipidemia  - Stable on Lovastatin                   Electronically signed by:  Cristin Alvarez CNP

## 2021-06-14 NOTE — PROGRESS NOTES
"[facility]    Facility:   [facility] [[id number]]   Code Status: DNR  PCP: Shoshana Faust PA-C   Phone: [phone]   Fax: [phone]      CHIEF COMPLAINT/REASON FOR VISIT:  Chief Complaint   Patient presents with     Review Of Multiple Medical Conditions     Fall, Ischemic Colitis       HISTORY COURSE:  Jessica Castellon is a 89 y.o. female with Hypertension, Dyslipidemia, Recurrent falls and Syncope who originally presented for evaluation of \"feeling off\".  She reported having intermittent rectal bleeding for months which has worsened recently.  She lost 20-30 pounds in the last several months.  She has been feeling lightheaded and falling in the last several months. On initial evaluation in the ER patient was found afebrile, normotensive, mildly tachycardic. Her labs were significant for microcytic anemia with hemoglobin 9.9. CT of the abdomen and pelvis demonstrated moderate to severe acute colitis from distal transverse portion to the rectum.  Initially, ischemic versus infectious colitis was suspected, however subsequently patient underwent flexible sigmoidoscopy and pathology was mostly consistent with ulcerative colitis.  Mrs. Castellon was started on IV steroids and was discharged on steroid taper.  She will follow-up with GI as an outpatient to establish care and to determine long-term treatment. She was stabilized and discharged to TCU for rehabilitation.    Today we reviewed her chronic medical conditions, all of which were stable. She does not have problems she would like to discuss. Her daughter is at bedside and she also does not have any concerns. They are looking forward to discharge, however no date has been sent for discharge from TCU. She denies any other concerns including headaches, vision changes, chest pain/pressure, difficulty breathing, SOB, abdominal pain, nausea, vomiting, diarrhea, dysuria, increasing weakness.     PHYSICAL EXAM:   /78  Pulse 65  " Resp 18  Wt 117 lb 6.4 oz (53.3 kg)  SpO2 97%  BMI 22.93 kg/m2  General appearance: alert, appears stated age and cooperative  Head: Normocephalic, without obvious abnormality, atraumatic  Throat: lips, mucosa, and tongue normal; teeth and gums normal  Neck: no adenopathy, no JVD, supple, symmetrical, trachea midline and thyroid not enlarged, symmetric, no tenderness/mass/nodules  Lungs: clear to auscultation bilaterally  Heart: regular rate and rhythm, S1, S2 normal, no murmur, click, rub or gallop  Abdomen: soft, non-tender; bowel sounds normal; no masses,  no organomegaly  Pulses: 2+ and symmetric  Skin: Skin color, texture, turgor normal. No rashes or lesions  Lymph nodes: cervical, supraclavicular, and anterior nodes without lymphadenopathy.    MEDICATION LIST:  Current Outpatient Prescriptions   Medication Sig     acetaminophen (TYLENOL) 325 MG tablet Take 650 mg by mouth daily as needed for pain (back pain).     lovastatin (ALTOPREV) 20 MG 24 hr tablet Take 20 mg by mouth every other day.     predniSONE (DELTASONE) 10 mg tablet Take 40 mg daily for 1 week, then 30 mg daily for 1 week, then 20 mg daily for 1 week, then 10 mg daily     sertraline (ZOLOFT) 25 MG tablet Take 25 mg by mouth daily.     vitamin A-vitamin C-vit E-min (OCUVITE) Tab tablet Take 1 tablet by mouth 2 (two) times a day.       DISCHARGE DIAGNOSIS:    ICD-10-CM    1. Ischemic colitis K55.9    2. Falls frequently R29.6    3. 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 Possible Presyncope       The patient is, or has been, under my care and I have initiated the establishment of the plan of care. This patient will be followed by a physician who will periodically review the plan of care.    DISCHARGE PLAN:    ICD-10-CM    1. Ischemic colitis K55.9 Follow up with GI per recommendations.    2. Falls frequently R29.6 Continue therapy.   3. 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              Greater than 35 minutes spent with this patient discussing discharge planning, home care, and discharge needs with patient, greater than 55% of this time spent on counseling.    Electronically signed by: CINDA Peck, CNP

## 2021-06-14 NOTE — PROGRESS NOTES
"     [facility]    DATE: 2017    NAME:  Jessica Castellon             :  1928  MRN: [id number]  CODE STATUS:  DNR    FACILITY:  [facility] [[id number]]       ROOM:   M214    CHIEF COMPLAINT/REASON FOR VISIT:  Chief Complaint   Patient presents with     Problem Visit     Headache and Nausea     HISTORY OF PRESENT ILLNESS: Jessica Castellon is a 89 y.o. female with Hypertension, Dyslipidemia, Recurrent falls and Syncope who originally presented for evaluation of \"feeling off\".  She reported having intermittent rectal bleeding for months which has worsened recently.  She lost 20-30 pounds in the last several months.  She has been feeling lightheaded and falling in the last several months.  On initial evaluation in the ER patient was found afebrile, normotensive, mildly tachycardic.  Her labs were significant for microcytic anemia with hemoglobin 9.9.  CT of the abdomen and pelvis demonstrated moderate to severe acute colitis from distal transverse portion to the rectum.  Initially, ischemic versus infectious colitis was suspected, however subsequently patient underwent flexible sigmoidoscopy and pathology was mostly consistent with ulcerative colitis.  Mrs. Castellon was started on IV steroids and was discharged on steroid taper.  She will follow-up with GI as an outpatient to establish care and to determine long-term treatment.  She was stabilized and discharged to TCU for rehabilitation.    Today, patient reports a headache with nausea.  She denies any history of migraine headaches.  However, she does have Hypertension but her blood pressures at time of assessment was 1345/74with a Hr of 104.  Denies any chest pain, palpitations, lightheadedness, or dizziness. BGM was 177. She has UC and her Sulfasalazine was recently increased to 1000 mg BID and the side effect of this medication is nausea and vomiting.   She reports normal bowel movements without any blood or pus " noted.    Past Medical History:   Diagnosis Date     Diabetes mellitus      Hyperlipidemia      Past Surgical History:   Procedure Laterality Date     CYST REMOVAL       SIGMOIDOSCOPY N/A 10/16/2017    Procedure: SIGMOIDOSCOPY with biopsy;  Surgeon: Trey Hercules MD;  Location: [facility];  Service:      No family history on file.     Social History     Social History     Marital status:      Spouse name: N/A     Number of children: N/A     Years of education: N/A     Occupational History     Not on file.     Social History Main Topics     Smoking status: Never Smoker     Smokeless tobacco: Never Used     Alcohol use No     Drug use: No     Sexual activity: Not on file     Other Topics Concern     Not on file     Social History Narrative     Allergies   Allergen Reactions     Pneumococcal Vaccine Rash     Current Outpatient Prescriptions   Medication Sig Dispense Refill     acetaminophen (TYLENOL) 325 MG tablet Take 650 mg by mouth daily as needed for pain (back pain).       lovastatin (ALTOPREV) 20 MG 24 hr tablet Take 20 mg by mouth every other day.       ondansetron (ZOFRAN) 4 MG tablet Take 4 mg by mouth every 8 (eight) hours as needed for nausea.       predniSONE (DELTASONE) 10 mg tablet Take 40 mg daily for 1 week, then 30 mg daily for 1 week, then 20 mg daily for 1 week, then 10 mg daily 70 tablet 0     sertraline (ZOLOFT) 25 MG tablet Take 25 mg by mouth daily.       sulfaSALAzine (AZULFIDINE) 500 mg tablet Take 1,000 mg by mouth 2 (two) times a day.       vitamin A-vitamin C-vit E-min (OCUVITE) Tab tablet Take 1 tablet by mouth 2 (two) times a day.       No current facility-administered medications for this visit.      REVIEW OF SYSTEMS:    Currently, no fever, chills, or rigors. Does not have any visual or hearing problems. Denies any chest pain, headaches, palpitations, lightheadedness, dizziness, shortness of breath, or cough. Appetite is good. Denies any GERD symptoms. Denies any  difficulty with swallowing, nausea, or vomiting.  Denies any abdominal pain, diarrhea or constipation. Denies any urinary symptoms. No insomnia. No active bleeding. No rash.     PHYSICAL EXAMINATION:  Vitals:    11/16/17 0839   BP: 135/74   Pulse: (!) 104   Resp: 18   Temp: 97.2  F (36.2  C)   SpO2: 93%   Weight: 118 lb (53.5 kg)       GENERAL: Awake, Alert, oriented x3, not in any form of acute distress, answers questions appropriately, follows simple commands, conversant  HEENT: Head is normocephalic with normal hair distribution. No evidence of trauma. Ears: No acute purulent discharge. Eyes: Conjunctivae pink with no scleral jaundice. Nose: Normal mucosa and septum. NECK: Supple with no cervical or supraclavicular lymphadenopathy. Trachea is midline.   CHEST: No tenderness or deformity, no crepitus  LUNG: Clear to auscultation with good chest expansion. There are no crackles or wheezes, normal AP diameter.  BACK: No kyphosis of the thoracic spine. Symmetric, no curvature, ROM normal, no CVA tenderness, no spinal tenderness   CVS: There is good S1  S2, there are no murmurs, rubs, gallops, or heaves, rhythm is regular,  2+ pulses symmetric in all extremities.  ABDOMEN: Globular and soft, nontender to palpation, non distended, no masses, no organomegaly, good bowel sounds, no rebound or guarding, no peritoneal signs.   EXTREMITIES: Atraumatic. Full range of motion on both upper and lower extremities, there is no tenderness to palpation, no pedal edema, no cyanosis or clubbing, no calf tenderness.  Pulses equal in all extremities, normal cap refill, no joint swelling.  SKIN: Warm and dry, no erythema noted.  Skin color, texture, no rashes or lesions.  NEUROLOGICAL: The patient is oriented to person, place and time. Strength and sensation are grossly intact. Face is symmetric.    LABS:    Lab Results   Component Value Date    WBC 7.2 10/15/2017    HGB 10.1 (L) 10/19/2017    HCT 25.6 (L) 10/15/2017    MCV 78 (L)  10/15/2017     10/15/2017     Results for orders placed or performed during the hospital encounter of 10/13/17   Basic Metabolic Panel   Result Value Ref Range    Sodium 131 (L) 136 - 145 mmol/L    Potassium 3.7 3.5 - 5.0 mmol/L    Chloride 102 98 - 107 mmol/L    CO2 23 22 - 31 mmol/L    Anion Gap, Calculation 6 5 - 18 mmol/L    Glucose 138 (H) 70 - 125 mg/dL    Calcium 7.7 (L) 8.5 - 10.5 mg/dL    BUN 3 (L) 8 - 28 mg/dL    Creatinine 0.62 0.60 - 1.10 mg/dL    GFR MDRD Af Amer >60 >60 mL/min/1.73m2    GFR MDRD Non Af Amer >60 >60 mL/min/1.73m2       ASSESSMENT/PLAN:    1. Nausea - Started on Zofran as needed   2. Ulcerative rectosigmoiditis with rectal bleeding - Stable. Prednisone taper almost complete. She will follow-up with GI as an outpatient to establish care and to determine long-term treatment.     3. Essential hypertension - Blood pressures are within target range, not on any medications at this time   4. Depression - Mood stale on Zoloft   5. Dyslipidemia - Continue Lovastatin   6. Diabetes Mellitus - Diet controlled.  BGM this morning was 177   7. Headache - Tylenol prn                         Electronically signed by:  Cristin Alvarez CNP

## 2021-06-15 NOTE — PROGRESS NOTES
[facility]        Visit Type: H & P    Code Status:  DNR  Facility:  [facility] [[id number]]          PCP: Shoshana Faust PA-C       PHONE: [phone]     FAX:[phone]        ASSESSMENT/PLAN:  1. Fall  With facial trauma, deconditioning.  Cont PT/OT   2. PE (pulmonary thromboembolism)  Recent, now off coumadin.  Cont Eliquis, no evidence of bleeding   3. Hyponatremia  With SIADH, continue fluild restriction, Na 134 on 12/29 from 129   4. Anemia  Recheck Hgb since ow on Eliquis   5. Essential hypertension  Stable, not on meds   6. Ulcerative rectosigmoiditis with h/o rectal bleeding  On Chronic prednisone, sulfasalazine.  No bleeding currently   7.      Type 2 diabetes mellitus- not as controlled, not on any medications.  Hemoglobin A1c done at [facility], will get results.  Hypoglycemia likely exacerbated by chronic steroid use      HISTORY OF PRESENT ILLNESS:   Jessica Castellon is a 89 y.o. female with a history of ulcerative colitis, hypertension, diabetes type 2 and recent PE on Coumadin who was admitted after a fall.  It was felt this is a secondary to deconditioning.  A repeat CT scan of the chest was done to follow-up on PE which was noted on a CT scan in November 2017.  Current CT scan showed a small amount of nonocclusive thrombus in the right lower lobe which is improved from previous.  The patient also had a Doppler ultrasound of the left leg which showed DVT.  Because she failed Coumadin, this was discontinued and she was started on Eliquis on 12/25.  From her fall, the patient sustained a facial trauma with ecchymoses around her eyes.  CT scan of the head showed preorbital soft tissue swelling but no intracranial hemorrhage stroke or lesion.    Currently, the patient feels that she is doing slightly better.  She does not have any headaches, visual changes, tenderness around her eyes.  She does not have any dizziness or lightheadedness.   She has not fallen here in our facility.  She states that her appetite is still slightly poor and she still feels slightly weak.  She states that she is sleeping much better.  She does not have any nausea or vomiting, abdominal pain, urinary or bowel problems.  She does not have any fevers, shortness of breath, chest pains.  She has a history of diabetes but this is diet controlled.  Her blood sugars are slightly high with range 104-196 throughout the day.  Unclear if prednisone is playing a role in patient's hyperglycemia    Other review of systems are negative.      PAST MEDICAL/SURGICAL HISTORY:  Past Medical History:   Diagnosis Date     Acute diverticulitis      Colitis      Diabetes mellitus      DVT (deep venous thrombosis)     popliteal vein of LLE     Facial trauma      HTN (hypertension)      Hyperlipidemia      Hyponatremia      Pulmonary thromboembolism      SIADH (syndrome of inappropriate ADH production)      UC (ulcerative colitis)      Past Surgical History:   Procedure Laterality Date     CYST REMOVAL       SIGMOIDOSCOPY N/A 10/16/2017    Procedure: SIGMOIDOSCOPY with biopsy;  Surgeon: Trey Hercules MD;  Location: [facility];  Service:        SOCIAL HISTORY:  Social History     Social History     Marital status:      Spouse name: N/A     Number of children: N/A     Years of education: N/A     Occupational History     Not on file.     Social History Main Topics     Smoking status: Never Smoker     Smokeless tobacco: Never Used     Alcohol use No     Drug use: No     Sexual activity: Not on file     Other Topics Concern     Not on file     Social History Narrative       FAMILY HISTORY:  Family History   Problem Relation Age of Onset     No Medical Problems Mother      No Medical Problems Father        MEDICATIONS:  Current Outpatient Prescriptions on File Prior to Visit   Medication Sig     acetaminophen (TYLENOL) 325 MG tablet Take 325 mg by mouth every 4 (four) hours as needed for  pain (back pain).      apixaban (ELIQUIS) 5 mg Tab tablet Take 10 mg by mouth 2 (two) times a day.     lovastatin (ALTOPREV) 20 MG 24 hr tablet Take 20 mg by mouth at bedtime.      melatonin 3 mg Tab tablet Take 3 mg by mouth at bedtime as needed.     predniSONE (DELTASONE) 10 mg tablet Take 5 mg by mouth daily.     sertraline (ZOLOFT) 25 MG tablet Take 25 mg by mouth daily.     sulfaSALAzine (AZULFIDINE) 500 mg tablet Take 1,000 mg by mouth 2 (two) times a day.     vitamin A-vitamin C-vit E-min (OCUVITE) Tab tablet Take 1 tablet by mouth 2 (two) times a day.     No current facility-administered medications on file prior to visit.        ALLERGIES:  Allergies   Allergen Reactions     Pneumococcal Vaccine Rash         PHYSICAL EXAMINATION:  Vital signs 98.5, 79, 18, 144/80 with repeat at 126/78, 98% room air  General: Awake, Alert, oriented x3, not in any form of acute distress, answers questions appropriately, follows simple commands, conversant, frail  HEENT: Pink conjunctiva, anicteric sclerae, oral mucosa is moist, bilateral periorbital fading ecchymoses, nontender, EOMs intact  NECK: Supple, without any lymphadenopathy, thyromegaly or any masses  LUNG: Clear to auscultation, good chest expansion. There are no crackles, no wheezes, normal AP diameter  CVS: There is good S1  S2, there are no murmurs, no heaves, rhythm is regular  ABDOMEN: Globular and soft, nontender to palpation, no organomegaly, good bowel sounds  EXTREMITIES: Good range of motion on both upper and lower extremities, no pedal edema, no cyanosis or clubbing, no calf tenderness  SKIN: Warm and dry, no rashes noted    LABS:  12/21-hemoglobin 12.1 with WBC 14.0  Lab Results   Component Value Date    CREATININE 0.63 11/24/2017    BUN 9 11/24/2017     (L) 12/29/2017    K 4.1 11/24/2017    CL 94 (L) 11/24/2017    CO2 26 11/24/2017           >45 minutes of total time spent, greater than 55% of the time spent in coordination of care and counseling  regarding the above medical issues and plan of care. I have reviewed the patient's medical records, labs and medications.  Discussion regarding above medical issues and plans done with the patient's family today, multiple questions answered.      Electronically signed by:Nohemy Haynes MD

## 2021-06-15 NOTE — PROGRESS NOTES
[facility]    Facility:   [facility] [[id number]]   Code Status: DNR      CHIEF COMPLAINT/REASON FOR VISIT:  Chief Complaint   Patient presents with     Problem Visit     Fall     Review Of Multiple Medical Conditions     Falls Frequently, Ischemic Colitis, HTN       HISTORY:      HPI: Jessica is a 89 y.o. female  with a history of ischemic colitis, hypertension, diabetes type 2 and recent PE on Coumadin who was admitted after a fall. It was felt this is a secondary to deconditioning. A repeat CT scan of the chest was done to follow-up on PE which was noted on a CT scan in November 2017. Current CT scan showed a small amount of nonocclusive thrombus in the right lower lobe which is improved from previous. The patient also had a Doppler ultrasound of the left leg which showed DVT. Because she failed Coumadin, this was discontinued and she was started on Eliquis on 12/25.  From her fall, the patient sustained a facial trauma with ecchymoses around her eyes. CT scan of the head showed preorbital soft tissue swelling but no intracranial hemorrhage stroke or lesion.    Today she is being evaluated for a routine check-up. During this time nursing staff relays that Jessica fell at 2:10 am. She did not hit her head and did not have any injuries. Vital signs were stable at time of injury. Nursing did share that the patient has had some very loose stools, she has also been more sleepy and is not interested in doing ADLs, eating, or getting up. Jessica states she just hasn't felt the best, however she is up today and at the dining room table when I first approached her. She is stating that she is feeling better than she has in the past few days. She denies any other concerns including headaches, vision changes, chest pain/pressure, difficulty breathing, SOB, abdominal pain, nausea, vomiting, dysuria, increasing weakness.     Past Medical History:   Diagnosis Date     Acute diverticulitis       Colitis      Diabetes mellitus      DVT (deep venous thrombosis)     popliteal vein of LLE     Facial trauma      HTN (hypertension)      Hyperlipidemia      Hyponatremia      Pulmonary thromboembolism      SIADH (syndrome of inappropriate ADH production)      UC (ulcerative colitis)              Family History   Problem Relation Age of Onset     No Medical Problems Mother      No Medical Problems Father      Social History     Social History     Marital status:      Spouse name: N/A     Number of children: N/A     Years of education: N/A     Social History Main Topics     Smoking status: Never Smoker     Smokeless tobacco: Never Used     Alcohol use No     Drug use: No     Sexual activity: Not on file     Other Topics Concern     Not on file     Social History Narrative     REVIEW OF SYSTEM:  Pertinent items are noted in HPI.    PHYSICAL EXAM:   /61  Pulse 95  Temp 99  F (37.2  C)  Resp 18  Wt 115 lb (52.2 kg)  SpO2 97%  BMI 22.46 kg/m2  General appearance: alert, appears stated age and cooperative  Head: Normocephalic, without obvious abnormality, with recent trauma-ecchymosis to face  Lungs: clear to auscultation bilaterally  Heart: regular rate and rhythm, S1, S2 normal, no murmur, click, rub or gallop  Abdomen: soft, non-tender; bowel sounds normal; no masses,  no organomegaly  Skin: Skin color--ecchymosis to face, texture, turgor normal. No rashes or lesions  Neurologic: Grossly normal      LABS:   None today.    ASSESSMENT:      ICD-10-CM    1. Falls frequently R29.6    2. Ischemic colitis K55.9    3. Essential hypertension I10        PLAN:    Continue current care plan for all chronic medical conditions, as they are stable- acute conditions and orders below. Encouraged patient to engage in healthy lifestyle behaviors such as engaging in social activities, exercising (PT/OT), eating well, and following their care plan. Follow up this week for routine check-up, or sooner if needed. Will  continue to monitor patient and work with NH staff collaboratively to work toward positive patient outcomes.    Falls  -Fall precautions.   -Monitor patient for changes in condition.  -Encouraged patient to call for help with every position change.    Diarrhea  -Ordered CBC, CMP, Stool Culture with O & P, C-Difficile toxin.   -Vitals every shift for 3 days.    -Standing order imodium for loose stools.     Greater than 25 minutes spent with patient with at least 55% of this time spent on counseling, education, and discussion of the above care plan with nursing staff, and patient.     Electronically signed by: Felecia Bazan CNP

## 2021-06-15 NOTE — PROGRESS NOTES
[facility]    Facility:   [facility] [[id number]]   Code Status: DNR  PCP: Shoshana Faust PA-C   Phone: [phone]   Fax: [phone]      CHIEF COMPLAINT/REASON FOR VISIT:  Chief Complaint   Patient presents with     Discharge Summary       HISTORY COURSE:  This is an 89-year-old woman who has had a steady decline of health in the last number of months with frequent falls and brings them ulcerative rectosigmoiditis as well as underlying diabetes and hypertension.  Her cognitive function has been declining with this whole process of decline as well.  She will be transferring to an assisted living facility with memory care facility as well.    Most recently on review of her records her average pulse has been in the 90s rather than the 100 which is recorded today.  Her most recent blood glucose is 151.  She denies any fevers or chills, no headache, no nasal congestion or sore throat, but she does have significant hearing loss.  She does not have chest pain or shortness of breath.  No abdominal pain or nausea.  No rectal bleeding currently.  No significant leg edema.  Joint aches are no worse than usual.  She did have recent x-ray which included the spine and hip and all that was present was osteoarthritis.  There was no new fracture.    Review of Systems   All other systems reviewed and are negative.      Vitals:    01/12/18 1508   BP: 146/81   Pulse: 100   Resp: 18   Temp: 98  F (36.7  C)   SpO2: 94%   Weight: 114 lb (51.7 kg)       Physical Exam   Constitutional: No distress.   HENT:   Mouth/Throat: Oropharynx is clear and moist.   Cardiovascular: Normal heart sounds.    Pulmonary/Chest: Breath sounds normal.   Abdominal: She exhibits no distension. There is no tenderness.   Musculoskeletal: She exhibits no edema.   Neurological: She is alert.   Skin: Skin is warm and dry.   Psychiatric: She has a normal mood and affect.   Nursing note and vitals reviewed.      MEDICATION  LIST:  Current Outpatient Prescriptions   Medication Sig     acetaminophen (TYLENOL) 325 MG tablet Take 325 mg by mouth every 4 (four) hours as needed for pain (back pain).      apixaban (ELIQUIS) 5 mg Tab tablet Take 10 mg by mouth 2 (two) times a day.     lovastatin (ALTOPREV) 20 MG 24 hr tablet Take 20 mg by mouth at bedtime.      melatonin 3 mg Tab tablet Take 3 mg by mouth at bedtime as needed.     predniSONE (DELTASONE) 10 mg tablet Take 5 mg by mouth daily.     sertraline (ZOLOFT) 25 MG tablet Take 25 mg by mouth daily.     sulfaSALAzine (AZULFIDINE) 500 mg tablet Take 1,000 mg by mouth 2 (two) times a day.     vitamin A-vitamin C-vit E-min (OCUVITE) Tab tablet Take 1 tablet by mouth 2 (two) times a day.       DISCHARGE DIAGNOSIS:    ICD-10-CM    1. Falls frequently R29.6    2. Weakness R53.1    3. 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 Essential hypertension I10    5. PE (pulmonary thromboembolism) I26.99        MEDICAL EQUIPMENT NEEDS:  She will not require any unusual equipment at this point.  She does use walker and wheelchair as needed.    DISCHARGE PLAN/FACE TO FACE:  I certify that services are/were furnished while this patient was under the care of a physician and that a physician or an allowed non-physician practitioner (NPP), had a face-to-face encounter that meets the physician face-to-face encounter requirements. The encounter was in whole, or in part, related to the primary reason for home health. The patient is confined to his/her home and needs intermittent skilled nursing, physical therapy, speech-language pathology, or the continued need for occupational therapy. A plan of care has been established by a physician and is periodically reviewed by a physician.  Date of Face-to-Face Encounter: 1/12/18.    I certify that, based on my findings, the following services are medically necessary home health services: She may need home health aide, registered nurse, and in the  future may require physical and occupational therapy, but these will be easily accessed at her assisted living.    My clinical findings support the need for the above skilled services because: (Please write a brief narrative summary that describes what the RN, PT, SLP, or other services will be doing in the home. A list of diagnoses in this section does not meet the CMS requirements.)  Registered nurse would be of help for medication set up and teaching, symptom and disease process monitoring and education.  Home health aide services would be for bathing and ADL needs.  Physical therapy would be of help for evaluation and treatment for strengthening, balance, endurance, and safety with mobility and ambulation.  Home occupational therapy would be of help for evaluation and treatment for strengthening, ADL needs, adaptive equipment, safety.    This patient is homebound because: (Please write a brief narrative summary describing the functional limitations as to why this patient is homebound and specifically what makes this patient homebound.)  She has significant weakness and frequent falls as well as underlying pain from arthritis.  She also has cognitive decline.    The patient is, or has been, under my care and I have initiated the establishment of the plan of care. This patient will be followed by a physician who will periodically review the plan of care.      Electronically signed by: Pete Koch MD

## 2021-06-15 NOTE — PROGRESS NOTES
[facility]    Facility:   [facility] [[id number]]   Code Status: DNR      CHIEF COMPLAINT/REASON FOR VISIT:  Chief Complaint   Patient presents with     Problem Visit     Weakness & Diarrhea       HISTORY:      HPI: Jessica is a 89 y.o. female  with a history of ischemic colitis, hypertension, diabetes type 2 and recent PE on Coumadin who was admitted after a fall. It was felt this is a secondary to deconditioning. A repeat CT scan of the chest was done to follow-up on PE which was noted on a CT scan in November 2017. Current CT scan showed a small amount of nonocclusive thrombus in the right lower lobe which is improved from previous. The patient also had a Doppler ultrasound of the left leg which showed DVT. Because she failed Coumadin, this was discontinued and she was started on Eliquis on 12/25.  From her fall, the patient sustained a facial trauma with ecchymoses around her eyes. CT scan of the head showed preorbital soft tissue swelling but no intracranial hemorrhage stroke or lesion.    Today she is being evaluated for hyponatremia, sodium of 127. She does not have any concerns or symptoms and has had issues with low sodium in the past. She is just getting over diarrhea. She did have a CXR to see if she had any pneumonia or other concerns, this did show a consolidation, however she recently had a chest CT in the hospital and she was diagnosed with a PE. Discussed case and situation with son and he believes that this is the PE and is not worried. She denies any other concerns including headaches, vision changes, chest pain/pressure, difficulty breathing, SOB, abdominal pain, nausea, vomiting, dysuria, increasing weakness.     Results for orders placed or performed in visit on 01/02/18   Basic Metabolic Panel   Result Value Ref Range    Sodium 127 (L) 136 - 145 mmol/L    Potassium 3.8 3.5 - 5.0 mmol/L    Chloride 96 (L) 98 - 107 mmol/L    CO2 25 22 - 31 mmol/L    Anion Gap,  Calculation 6 5 - 18 mmol/L    Glucose 97 70 - 125 mg/dL    Calcium 8.1 (L) 8.5 - 10.5 mg/dL    BUN 9 8 - 28 mg/dL    Creatinine 0.53 (L) 0.60 - 1.10 mg/dL    GFR MDRD Af Amer >60 >60 mL/min/1.73m2    GFR MDRD Non Af Amer >60 >60 mL/min/1.73m2       Past Medical History:   Diagnosis Date     Acute diverticulitis      Colitis      Diabetes mellitus      DVT (deep venous thrombosis)     popliteal vein of LLE     Facial trauma      HTN (hypertension)      Hyperlipidemia      Hyponatremia      Pulmonary thromboembolism      SIADH (syndrome of inappropriate ADH production)      UC (ulcerative colitis)              Family History   Problem Relation Age of Onset     No Medical Problems Mother      No Medical Problems Father      Social History     Social History     Marital status:      Spouse name: N/A     Number of children: N/A     Years of education: N/A     Social History Main Topics     Smoking status: Never Smoker     Smokeless tobacco: Never Used     Alcohol use No     Drug use: No     Sexual activity: Not on file     Other Topics Concern     Not on file     Social History Narrative     REVIEW OF SYSTEM:  Pertinent items are noted in HPI.    PHYSICAL EXAM:   /71  Pulse 94  Temp (!) 95.1  F (35.1  C)  Resp 16  Wt 112 lb (50.8 kg)  SpO2 97%  BMI 21.87 kg/m2  General appearance: alert, appears stated age and cooperative  Head: Normocephalic, without obvious abnormality, with recent trauma-ecchymosis to face  Lungs: clear to auscultation bilaterally  Heart: regular rate and rhythm, S1, S2 normal, no murmur, click, rub or gallop  Abdomen: soft, non-tender; bowel sounds normal; no masses,  no organomegaly  Skin: Skin color--ecchymosis to face, texture, turgor normal. No rashes or lesions  Neurologic: Grossly normal      LABS:   None today.    ASSESSMENT:      ICD-10-CM    1. Diarrhea R19.7    2. Weakness R53.1        PLAN:    Continue current care plan for all chronic medical conditions, as they are  stable- acute conditions and orders below. Encouraged patient to engage in healthy lifestyle behaviors such as engaging in social activities, exercising (PT/OT), eating well, and following their care plan. Follow up this week for routine check-up, or sooner if needed. Will continue to monitor patient and work with NH staff collaboratively to work toward positive patient outcomes.    Hyponatremia  -Patient continues fluid restriction.  -Discontinue Sertraline to 12.5 mg by mouth for one week and then discontinue.   -BMP next week.     Diarrhea  -Nothing infectious noted at this time.   -Appears to be self-limiting and diarrhea is improving.   -Continue to monitor.     Greater than 25 minutes spent with patient with at least 55% of this time spent on counseling, education, and discussion of the above care plan with nursing staff, and patient.     Electronically signed by: Felecia Bazan CNP

## 2021-06-15 NOTE — PROGRESS NOTES
[facility]    Facility:   [facility] [[id number]]   Code Status: DNR      CHIEF COMPLAINT/REASON FOR VISIT:  Chief Complaint   Patient presents with     Review Of Multiple Medical Conditions       HISTORY:      HPI: Jessica is a 89 y.o. female who has been experiencing a steady decline in health in many ways.  She has had frequent falls.  She forgets to call for help.  Her nutrition has been on the decline with decreased interest in eating.  She always needs assistance of one for toileting.      Today on review of systems she denies any problems in terms of fevers or chills, cold symptoms of congestion or sore throat, headache, pain, shortness of breath, abdominal pain, nausea, or significant pain anywhere.    Past Medical History:   Diagnosis Date     Acute diverticulitis      Colitis      Diabetes mellitus      DVT (deep venous thrombosis)     popliteal vein of LLE     Facial trauma      HTN (hypertension)      Hyperlipidemia      Hyponatremia      Pulmonary thromboembolism      SIADH (syndrome of inappropriate ADH production)      UC (ulcerative colitis)              Family History   Problem Relation Age of Onset     No Medical Problems Mother      No Medical Problems Father      Social History     Social History     Marital status:      Spouse name: N/A     Number of children: N/A     Years of education: N/A     Social History Main Topics     Smoking status: Never Smoker     Smokeless tobacco: Never Used     Alcohol use No     Drug use: No     Sexual activity: Not on file     Other Topics Concern     Not on file     Social History Narrative         Review of Systems   All other systems reviewed and are negative.      .  Vitals:    01/05/18 0851   BP: 128/71   Pulse: 94   Resp: 16   Temp: (!) 95.1  F (35.1  C)   SpO2: 97%   Weight: 112 lb (50.8 kg)       Physical Exam   Constitutional: No distress.   HENT:   Mouth/Throat: Oropharynx is clear and moist.   Eyes: Right eye  exhibits no discharge. Left eye exhibits no discharge.   Cardiovascular: Normal rate.    Pulmonary/Chest: Effort normal and breath sounds normal.   Abdominal: She exhibits no distension. There is no tenderness.   Musculoskeletal: She exhibits no edema.   Neurological: She is alert.   Skin: Skin is warm and dry.   Psychiatric: She has a normal mood and affect.   Nursing note and vitals reviewed.        LABS:   Blood glucose is 158 and has been related to use of steroids for proctosigmoiditis    ASSESSMENT:      ICD-10-CM    1. Falls frequently R29.6    2. Weakness R53.1    3. 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 Essential hypertension I10        PLAN:    Continue with current plans of therapy and nutritional support as able.  It appears that she will need assisted living upon discharge      Electronically signed by: Pete Koch MD

## 2021-06-15 NOTE — PROGRESS NOTES
[facility]    Facility:   [facility] [[id number]]   Code Status: DNR      CHIEF COMPLAINT/REASON FOR VISIT:  Chief Complaint   Patient presents with     Review Of Multiple Medical Conditions     Weakness, Falls, Ischemic Colitis       HISTORY:      HPI: Jessica is a 89 y.o. female  with a history of ischemic colitis, hypertension, diabetes type 2 and recent PE on Coumadin who was admitted after a fall. It was felt this is a secondary to deconditioning. A repeat CT scan of the chest was done to follow-up on PE which was noted on a CT scan in November 2017. Current CT scan showed a small amount of nonocclusive thrombus in the right lower lobe which is improved from previous. The patient also had a Doppler ultrasound of the left leg which showed DVT. Because she failed Coumadin, this was discontinued and she was started on Eliquis on 12/25.  From her fall, the patient sustained a facial trauma with ecchymoses around her eyes. CT scan of the head showed preorbital soft tissue swelling but no intracranial hemorrhage stroke or lesion.    Today Jessica is being evaluated for a routine TCU follow-up. Nursing staff deny any concerns and state they feel she is improving. She has had some mild diarrhea that has reoccurred but it seems to be self limited and responds well to imodium. She denies any other concerns including headaches, vision changes, chest pain/pressure, difficulty breathing, SOB, abdominal pain, nausea, vomiting, dysuria, increasing weakness.     Results for orders placed or performed in visit on 01/02/18   Basic Metabolic Panel   Result Value Ref Range    Sodium 127 (L) 136 - 145 mmol/L    Potassium 3.8 3.5 - 5.0 mmol/L    Chloride 96 (L) 98 - 107 mmol/L    CO2 25 22 - 31 mmol/L    Anion Gap, Calculation 6 5 - 18 mmol/L    Glucose 97 70 - 125 mg/dL    Calcium 8.1 (L) 8.5 - 10.5 mg/dL    BUN 9 8 - 28 mg/dL    Creatinine 0.53 (L) 0.60 - 1.10 mg/dL    GFR MDRD Af Amer >60 >60  mL/min/1.73m2    GFR MDRD Non Af Amer >60 >60 mL/min/1.73m2       Past Medical History:   Diagnosis Date     Acute diverticulitis      Colitis      Diabetes mellitus      DVT (deep venous thrombosis)     popliteal vein of LLE     Facial trauma      HTN (hypertension)      Hyperlipidemia      Hyponatremia      Pulmonary thromboembolism      SIADH (syndrome of inappropriate ADH production)      UC (ulcerative colitis)              Family History   Problem Relation Age of Onset     No Medical Problems Mother      No Medical Problems Father      Social History     Social History     Marital status:      Spouse name: N/A     Number of children: N/A     Years of education: N/A     Social History Main Topics     Smoking status: Never Smoker     Smokeless tobacco: Never Used     Alcohol use No     Drug use: No     Sexual activity: Not on file     Other Topics Concern     Not on file     Social History Narrative     REVIEW OF SYSTEM:  Pertinent items are noted in HPI.    PHYSICAL EXAM:   /81  Pulse 100  Temp 98  F (36.7  C)  Resp 18  Wt 114 lb (51.7 kg)  SpO2 94%  BMI 22.26 kg/m2  General appearance: alert, appears stated age and cooperative  Head: Normocephalic, without obvious abnormality, with recent trauma-ecchymosis to face  Lungs: clear to auscultation bilaterally  Heart: regular rate and rhythm, S1, S2 normal, no murmur, click, rub or gallop  Abdomen: soft, non-tender; bowel sounds normal; no masses,  no organomegaly  Skin: Skin color--ecchymosis to face, texture, turgor normal. No rashes or lesions  Neurologic: Grossly normal      LABS:   None today.    ASSESSMENT:      ICD-10-CM    1. Falls frequently R29.6    2. Weakness R53.1    3. Ischemic colitis K55.9        PLAN:    Continue current care plan for all chronic medical conditions, as they are stable- acute conditions and orders below. Encouraged patient to engage in healthy lifestyle behaviors such as engaging in social activities, exercising  (PT/OT), eating well, and following their care plan. Follow up this week for routine check-up, or sooner if needed. Will continue to monitor patient and work with NH staff collaboratively to work toward positive patient outcomes.     Greater than 25 minutes spent with patient with at least 55% of this time spent on counseling, education, and discussion of the above care plan with nursing staff, and patient.     Electronically signed by: Felecia Bazan CNP

## 2021-06-15 NOTE — PROGRESS NOTES
"[facility]    Facility:   [facility] [[id number]]   Code Status: DNR      CHIEF COMPLAINT/REASON FOR VISIT:  Chief Complaint   Patient presents with     Review Of Multiple Medical Conditions     Admission to TCU, Frequent Falls, HTN, Ischemic Bowel       HISTORY:      HPI: Jessica is a 89 y.o. female  with a history of ischemic colitis, hypertension, diabetes type 2 and recent PE on Coumadin who was admitted after a fall. It was felt this is a secondary to deconditioning. A repeat CT scan of the chest was done to follow-up on PE which was noted on a CT scan in November 2017. Current CT scan showed a small amount of nonocclusive thrombus in the right lower lobe which is improved from previous. The patient also had a Doppler ultrasound of the left leg which showed DVT. Because she failed Coumadin, this was discontinued and she was started on Eliquis on 12/25.  From her fall, the patient sustained a facial trauma with ecchymoses around her eyes. CT scan of the head showed preorbital soft tissue swelling but no intracranial hemorrhage stroke or lesion.    Today she is being evaluated for an admission visit and check-in. She is resting in her room on her cart. She states she is getting more and more confused and doesn't really know what happened. When asked about her fall she states she \"supposes\" she fell but really can't remember anything about it. She doesn't want to talk about her medical problems any furthers, she is \"tired of it\". She doesn't share any concerns. When specifically asked a comprehensive ROS, she denies any concerns but states she is tired and wants to nap. She denies any other concerns including headaches, vision changes, chest pain/pressure, difficulty breathing, SOB, abdominal pain, nausea, vomiting, diarrhea, dysuria, increasing weakness.     Past Medical History:   Diagnosis Date     Acute diverticulitis      Colitis      Diabetes mellitus      DVT (deep venous " thrombosis)     popliteal vein of LLE     Facial trauma      HTN (hypertension)      Hyperlipidemia      Hyponatremia      Pulmonary thromboembolism      SIADH (syndrome of inappropriate ADH production)      UC (ulcerative colitis)              Family History   Problem Relation Age of Onset     No Medical Problems Mother      No Medical Problems Father      Social History     Social History     Marital status:      Spouse name: N/A     Number of children: N/A     Years of education: N/A     Social History Main Topics     Smoking status: Never Smoker     Smokeless tobacco: Never Used     Alcohol use No     Drug use: No     Sexual activity: Not on file     Other Topics Concern     Not on file     Social History Narrative       REVIEW OF SYSTEM:  Pertinent items are noted in HPI.    PHYSICAL EXAM:   /73  Pulse (!) 108  Temp 98.7  F (37.1  C)  Resp 16  SpO2 95%  General appearance: alert, appears stated age and cooperative  Head: Normocephalic, without obvious abnormality, with recent trauma-ecchymosis to face  Lungs: clear to auscultation bilaterally  Heart: regular rate and rhythm, S1, S2 normal, no murmur, click, rub or gallop  Abdomen: soft, non-tender; bowel sounds normal; no masses,  no organomegaly  Skin: Skin color--ecchymosis to face, texture, turgor normal. No rashes or lesions  Neurologic: Grossly normal      LABS:   None today.    ASSESSMENT:      ICD-10-CM    1. Falls frequently R29.6    2. Essential hypertension I10    3. Ischemic colitis K55.9        PLAN:    Admission history and physical per MD in the next week. At this time continue current care plan for all chronic medical conditions, as they are stable. Encouraged patient to engage in PT/OT for strengthening and conditioning. Encouraged patient to work closely with nursing staff to ensure any medical complaints are quickly addressed. Follow up this week or sooner if needed. Will continue to monitor patient and work with nursing staff  collaboratively to work toward positive patient outcomes.    Greater than 35 minutes spent with patient with at least 55% of this time spent on counseling, education, and discussion of the above care plan with nursing staff, and patient.     Electronically signed by: Felecia Bazan CNP

## 2021-08-03 PROBLEM — K51.311 ULCERATIVE RECTOSIGMOIDITIS WITH RECTAL BLEEDING (H): Status: RESOLVED | Noted: 2017-10-19 | Resolved: 2017-10-31
